# Patient Record
Sex: MALE | Race: WHITE | HISPANIC OR LATINO | Employment: UNEMPLOYED | URBAN - METROPOLITAN AREA
[De-identification: names, ages, dates, MRNs, and addresses within clinical notes are randomized per-mention and may not be internally consistent; named-entity substitution may affect disease eponyms.]

---

## 2020-08-14 ENCOUNTER — HOSPITAL ENCOUNTER (EMERGENCY)
Facility: HOSPITAL | Age: 50
Discharge: HOME | End: 2020-08-14
Attending: EMERGENCY MEDICINE
Payer: MEDICAID

## 2020-08-14 VITALS
WEIGHT: 240 LBS | SYSTOLIC BLOOD PRESSURE: 123 MMHG | HEART RATE: 109 BPM | OXYGEN SATURATION: 95 % | HEIGHT: 71 IN | BODY MASS INDEX: 33.6 KG/M2 | DIASTOLIC BLOOD PRESSURE: 77 MMHG | RESPIRATION RATE: 18 BRPM | TEMPERATURE: 99 F

## 2020-08-14 DIAGNOSIS — N49.2: Primary | ICD-10-CM

## 2020-08-14 DIAGNOSIS — S30.813A: Primary | ICD-10-CM

## 2020-08-14 PROCEDURE — 99284 EMERGENCY DEPT VISIT MOD MDM: CPT

## 2020-08-14 RX ORDER — CEPHALEXIN 500 MG/1
500 CAPSULE ORAL EVERY 8 HOURS
Qty: 21 CAPSULE | Refills: 0 | Status: SHIPPED | OUTPATIENT
Start: 2020-08-14 | End: 2020-08-21

## 2020-08-14 RX ORDER — BACITRACIN ZINC 500 UNIT/G
OINTMENT (GRAM) TOPICAL 2 TIMES DAILY
Qty: 28 G | Refills: 0 | Status: ON HOLD | OUTPATIENT
Start: 2020-08-14 | End: 2021-05-28
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
## 2020-08-14 NOTE — DISCHARGE INSTRUCTIONS
I recommend washing the affected area twice daily with soap and warm water and applying the prescribed ointment afterward
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
## 2021-01-14 ENCOUNTER — HOSPITAL ENCOUNTER (EMERGENCY)
Facility: HOSPITAL | Age: 51
Discharge: HOME | End: 2021-01-14
Attending: EMERGENCY MEDICINE
Payer: MEDICAID

## 2021-01-14 VITALS
HEART RATE: 98 BPM | SYSTOLIC BLOOD PRESSURE: 122 MMHG | RESPIRATION RATE: 18 BRPM | DIASTOLIC BLOOD PRESSURE: 84 MMHG | WEIGHT: 244 LBS | OXYGEN SATURATION: 100 % | BODY MASS INDEX: 34.16 KG/M2 | HEIGHT: 71 IN | TEMPERATURE: 98 F

## 2021-01-14 DIAGNOSIS — H57.11 ACUTE RIGHT EYE PAIN: Primary | ICD-10-CM

## 2021-01-14 DIAGNOSIS — S05.01XA ABRASION OF RIGHT CORNEA, INITIAL ENCOUNTER: ICD-10-CM

## 2021-01-14 PROCEDURE — 25000003 PHARM REV CODE 250: Performed by: PHYSICIAN ASSISTANT

## 2021-01-14 PROCEDURE — 99284 EMERGENCY DEPT VISIT MOD MDM: CPT

## 2021-01-14 RX ORDER — IBUPROFEN 600 MG/1
600 TABLET ORAL EVERY 6 HOURS PRN
Qty: 20 TABLET | Refills: 0 | Status: ON HOLD | OUTPATIENT
Start: 2021-01-14 | End: 2021-05-28 | Stop reason: CLARIF

## 2021-01-14 RX ORDER — ERYTHROMYCIN 5 MG/G
OINTMENT OPHTHALMIC
Qty: 1 TUBE | Refills: 0 | Status: ON HOLD | OUTPATIENT
Start: 2021-01-14 | End: 2021-05-28 | Stop reason: CLARIF

## 2021-01-14 RX ORDER — PROPARACAINE HYDROCHLORIDE 5 MG/ML
2 SOLUTION/ DROPS OPHTHALMIC
Status: COMPLETED | OUTPATIENT
Start: 2021-01-14 | End: 2021-01-14

## 2021-01-14 RX ADMIN — FLUORESCEIN SODIUM 1 EACH: 1 STRIP OPHTHALMIC at 06:01

## 2021-01-14 RX ADMIN — PROPARACAINE HYDROCHLORIDE 2 DROP: 5 SOLUTION/ DROPS OPHTHALMIC at 06:01

## 2021-04-11 ENCOUNTER — IMMUNIZATION (OUTPATIENT)
Dept: PRIMARY CARE CLINIC | Facility: CLINIC | Age: 51
End: 2021-04-11
Payer: MEDICAID

## 2021-04-11 DIAGNOSIS — Z23 NEED FOR VACCINATION: Primary | ICD-10-CM

## 2021-04-11 PROCEDURE — 0001A PR IMMUNIZ ADMIN, SARS-COV-2 COVID-19 VACC, 30MCG/0.3ML, 1ST DOSE: ICD-10-PCS | Mod: CV19,S$GLB,, | Performed by: INTERNAL MEDICINE

## 2021-04-11 PROCEDURE — 0001A PR IMMUNIZ ADMIN, SARS-COV-2 COVID-19 VACC, 30MCG/0.3ML, 1ST DOSE: CPT | Mod: CV19,S$GLB,, | Performed by: INTERNAL MEDICINE

## 2021-04-11 PROCEDURE — 91300 PR SARS-COV- 2 COVID-19 VACCINE, NO PRSV, 30MCG/0.3ML, IM: ICD-10-PCS | Mod: S$GLB,,, | Performed by: INTERNAL MEDICINE

## 2021-04-11 PROCEDURE — 91300 PR SARS-COV- 2 COVID-19 VACCINE, NO PRSV, 30MCG/0.3ML, IM: CPT | Mod: S$GLB,,, | Performed by: INTERNAL MEDICINE

## 2021-04-11 RX ADMIN — Medication 0.3 ML: at 03:04

## 2021-05-28 ENCOUNTER — ANESTHESIA (OUTPATIENT)
Dept: SURGERY | Facility: HOSPITAL | Age: 51
End: 2021-05-28
Payer: MEDICAID

## 2021-05-28 ENCOUNTER — HOSPITAL ENCOUNTER (OUTPATIENT)
Facility: HOSPITAL | Age: 51
Discharge: HOME | End: 2021-05-29
Attending: EMERGENCY MEDICINE | Admitting: ORTHOPAEDIC SURGERY
Payer: MEDICAID

## 2021-05-28 ENCOUNTER — ANESTHESIA EVENT (OUTPATIENT)
Dept: SURGERY | Facility: HOSPITAL | Age: 51
End: 2021-05-28
Payer: MEDICAID

## 2021-05-28 DIAGNOSIS — S82.852A CLOSED TRIMALLEOLAR FRACTURE OF LEFT ANKLE, INITIAL ENCOUNTER: ICD-10-CM

## 2021-05-28 DIAGNOSIS — S93.05XA CLOSED DISLOCATION OF LEFT TALUS, INITIAL ENCOUNTER: Primary | ICD-10-CM

## 2021-05-28 DIAGNOSIS — S82.852A CLOSED DISPLACED TRIMALLEOLAR FRACTURE OF LEFT ANKLE, INITIAL ENCOUNTER: ICD-10-CM

## 2021-05-28 DIAGNOSIS — Z01.810 PREOP CARDIOVASCULAR EXAM: ICD-10-CM

## 2021-05-28 LAB
ALBUMIN SERPL BCP-MCNC: 3.9 G/DL (ref 3.5–5.2)
ALP SERPL-CCNC: 43 U/L (ref 55–135)
ALT SERPL W/O P-5'-P-CCNC: 75 U/L (ref 10–44)
ANION GAP SERPL CALC-SCNC: 14 MMOL/L (ref 8–16)
AST SERPL-CCNC: 66 U/L (ref 10–40)
BASOPHILS # BLD AUTO: 0.04 K/UL (ref 0–0.2)
BASOPHILS NFR BLD: 0.4 % (ref 0–1.9)
BILIRUB SERPL-MCNC: 0.5 MG/DL (ref 0.1–1)
BUN SERPL-MCNC: 6 MG/DL (ref 6–20)
CALCIUM SERPL-MCNC: 9.2 MG/DL (ref 8.7–10.5)
CHLORIDE SERPL-SCNC: 102 MMOL/L (ref 95–110)
CO2 SERPL-SCNC: 19 MMOL/L (ref 23–29)
CREAT SERPL-MCNC: 0.8 MG/DL (ref 0.5–1.4)
CTP QC/QA: YES
DIFFERENTIAL METHOD: ABNORMAL
EOSINOPHIL # BLD AUTO: 0 K/UL (ref 0–0.5)
EOSINOPHIL NFR BLD: 0.3 % (ref 0–8)
ERYTHROCYTE [DISTWIDTH] IN BLOOD BY AUTOMATED COUNT: 12.6 % (ref 11.5–14.5)
EST. GFR  (AFRICAN AMERICAN): >60 ML/MIN/1.73 M^2
EST. GFR  (NON AFRICAN AMERICAN): >60 ML/MIN/1.73 M^2
ESTIMATED AVG GLUCOSE: 105 MG/DL (ref 68–131)
GLUCOSE SERPL-MCNC: 109 MG/DL (ref 70–110)
HBA1C MFR BLD: 5.3 % (ref 4–5.6)
HCT VFR BLD AUTO: 45 % (ref 40–54)
HGB BLD-MCNC: 15.5 G/DL (ref 14–18)
IMM GRANULOCYTES # BLD AUTO: 0.04 K/UL (ref 0–0.04)
IMM GRANULOCYTES NFR BLD AUTO: 0.4 % (ref 0–0.5)
INR PPP: 1 (ref 0.8–1.2)
LYMPHOCYTES # BLD AUTO: 2.1 K/UL (ref 1–4.8)
LYMPHOCYTES NFR BLD: 22.6 % (ref 18–48)
MAGNESIUM SERPL-MCNC: 2.3 MG/DL (ref 1.6–2.6)
MCH RBC QN AUTO: 31.1 PG (ref 27–31)
MCHC RBC AUTO-ENTMCNC: 34.4 G/DL (ref 32–36)
MCV RBC AUTO: 90 FL (ref 82–98)
MONOCYTES # BLD AUTO: 0.6 K/UL (ref 0.3–1)
MONOCYTES NFR BLD: 6.3 % (ref 4–15)
NEUTROPHILS # BLD AUTO: 6.6 K/UL (ref 1.8–7.7)
NEUTROPHILS NFR BLD: 70 % (ref 38–73)
NRBC BLD-RTO: 0 /100 WBC
PHOSPHATE SERPL-MCNC: 4.1 MG/DL (ref 2.7–4.5)
PLATELET # BLD AUTO: 191 K/UL (ref 150–450)
PMV BLD AUTO: 9.7 FL (ref 9.2–12.9)
POTASSIUM SERPL-SCNC: 3.7 MMOL/L (ref 3.5–5.1)
PREALB SERPL-MCNC: 35 MG/DL (ref 20–43)
PROT SERPL-MCNC: 7.4 G/DL (ref 6–8.4)
PROTHROMBIN TIME: 10.9 SEC (ref 9–12.5)
RBC # BLD AUTO: 4.98 M/UL (ref 4.6–6.2)
SARS-COV-2 RDRP RESP QL NAA+PROBE: NEGATIVE
SODIUM SERPL-SCNC: 135 MMOL/L (ref 136–145)
TRANSFERRIN SERPL-MCNC: 313 MG/DL (ref 200–375)
WBC # BLD AUTO: 9.39 K/UL (ref 3.9–12.7)

## 2021-05-28 PROCEDURE — 27829 TREAT LOWER LEG JOINT: CPT | Mod: 51,LT,, | Performed by: ORTHOPAEDIC SURGERY

## 2021-05-28 PROCEDURE — D9220A PRA ANESTHESIA: ICD-10-PCS | Mod: ANES,,, | Performed by: STUDENT IN AN ORGANIZED HEALTH CARE EDUCATION/TRAINING PROGRAM

## 2021-05-28 PROCEDURE — 01480 ANES OPEN PX LOWER L/A/F NOS: CPT | Performed by: ORTHOPAEDIC SURGERY

## 2021-05-28 PROCEDURE — 27818 TREATMENT OF ANKLE FRACTURE: CPT | Mod: LT,59

## 2021-05-28 PROCEDURE — 80053 COMPREHEN METABOLIC PANEL: CPT | Performed by: EMERGENCY MEDICINE

## 2021-05-28 PROCEDURE — 93010 EKG 12-LEAD: ICD-10-PCS | Mod: ,,, | Performed by: INTERNAL MEDICINE

## 2021-05-28 PROCEDURE — 76942 ECHO GUIDE FOR BIOPSY: CPT | Performed by: STUDENT IN AN ORGANIZED HEALTH CARE EDUCATION/TRAINING PROGRAM

## 2021-05-28 PROCEDURE — 84466 ASSAY OF TRANSFERRIN: CPT | Performed by: STUDENT IN AN ORGANIZED HEALTH CARE EDUCATION/TRAINING PROGRAM

## 2021-05-28 PROCEDURE — 63600175 PHARM REV CODE 636 W HCPCS: Performed by: STUDENT IN AN ORGANIZED HEALTH CARE EDUCATION/TRAINING PROGRAM

## 2021-05-28 PROCEDURE — 94761 N-INVAS EAR/PLS OXIMETRY MLT: CPT

## 2021-05-28 PROCEDURE — 76942 ECHO GUIDE FOR BIOPSY: CPT | Mod: 26,,, | Performed by: STUDENT IN AN ORGANIZED HEALTH CARE EDUCATION/TRAINING PROGRAM

## 2021-05-28 PROCEDURE — 99285 EMERGENCY DEPT VISIT HI MDM: CPT | Mod: 25,,, | Performed by: EMERGENCY MEDICINE

## 2021-05-28 PROCEDURE — 37000008 HC ANESTHESIA 1ST 15 MINUTES: Performed by: ORTHOPAEDIC SURGERY

## 2021-05-28 PROCEDURE — 93005 ELECTROCARDIOGRAM TRACING: CPT | Mod: 59

## 2021-05-28 PROCEDURE — C1769 GUIDE WIRE: HCPCS | Performed by: ORTHOPAEDIC SURGERY

## 2021-05-28 PROCEDURE — D9220A PRA ANESTHESIA: Mod: ANES,,, | Performed by: STUDENT IN AN ORGANIZED HEALTH CARE EDUCATION/TRAINING PROGRAM

## 2021-05-28 PROCEDURE — D9220A PRA ANESTHESIA: ICD-10-PCS | Mod: CRNA,,, | Performed by: NURSE ANESTHETIST, CERTIFIED REGISTERED

## 2021-05-28 PROCEDURE — 71000039 HC RECOVERY, EACH ADD'L HOUR: Performed by: ORTHOPAEDIC SURGERY

## 2021-05-28 PROCEDURE — 37000009 HC ANESTHESIA EA ADD 15 MINS: Performed by: ORTHOPAEDIC SURGERY

## 2021-05-28 PROCEDURE — 85025 COMPLETE CBC W/AUTO DIFF WBC: CPT | Performed by: EMERGENCY MEDICINE

## 2021-05-28 PROCEDURE — 63600175 PHARM REV CODE 636 W HCPCS: Performed by: NURSE ANESTHETIST, CERTIFIED REGISTERED

## 2021-05-28 PROCEDURE — 25000003 PHARM REV CODE 250: Performed by: STUDENT IN AN ORGANIZED HEALTH CARE EDUCATION/TRAINING PROGRAM

## 2021-05-28 PROCEDURE — G0378 HOSPITAL OBSERVATION PER HR: HCPCS

## 2021-05-28 PROCEDURE — 27829 PR OPEN TX DISTAL TIBIOFIBULAR JOINT DISRUPTION: ICD-10-PCS | Mod: 51,LT,, | Performed by: ORTHOPAEDIC SURGERY

## 2021-05-28 PROCEDURE — 83735 ASSAY OF MAGNESIUM: CPT | Performed by: STUDENT IN AN ORGANIZED HEALTH CARE EDUCATION/TRAINING PROGRAM

## 2021-05-28 PROCEDURE — 27823 TREATMENT OF ANKLE FRACTURE: CPT | Mod: LT,,, | Performed by: ORTHOPAEDIC SURGERY

## 2021-05-28 PROCEDURE — 96375 TX/PRO/DX INJ NEW DRUG ADDON: CPT | Mod: 59 | Performed by: EMERGENCY MEDICINE

## 2021-05-28 PROCEDURE — 64450 POPLITEAL SINGLE SHOT: ICD-10-PCS | Mod: 59,LT,, | Performed by: STUDENT IN AN ORGANIZED HEALTH CARE EDUCATION/TRAINING PROGRAM

## 2021-05-28 PROCEDURE — D9220A PRA ANESTHESIA: Mod: CRNA,,, | Performed by: NURSE ANESTHETIST, CERTIFIED REGISTERED

## 2021-05-28 PROCEDURE — 27823 PR OPEN TX TRIMALLEOLAR ANKLE FX W FIX PST LIP: ICD-10-PCS | Mod: LT,,, | Performed by: ORTHOPAEDIC SURGERY

## 2021-05-28 PROCEDURE — 93010 ELECTROCARDIOGRAM REPORT: CPT | Mod: ,,, | Performed by: INTERNAL MEDICINE

## 2021-05-28 PROCEDURE — 64447 NJX AA&/STRD FEMORAL NRV IMG: CPT | Performed by: STUDENT IN AN ORGANIZED HEALTH CARE EDUCATION/TRAINING PROGRAM

## 2021-05-28 PROCEDURE — 84100 ASSAY OF PHOSPHORUS: CPT | Performed by: STUDENT IN AN ORGANIZED HEALTH CARE EDUCATION/TRAINING PROGRAM

## 2021-05-28 PROCEDURE — 64445 NJX AA&/STRD SCIATIC NRV IMG: CPT | Mod: 59 | Performed by: STUDENT IN AN ORGANIZED HEALTH CARE EDUCATION/TRAINING PROGRAM

## 2021-05-28 PROCEDURE — 76942 ECHO GUIDE FOR BIOPSY: CPT | Mod: 59 | Performed by: STUDENT IN AN ORGANIZED HEALTH CARE EDUCATION/TRAINING PROGRAM

## 2021-05-28 PROCEDURE — 64450 NJX AA&/STRD OTHER PN/BRANCH: CPT | Mod: 59,LT,, | Performed by: STUDENT IN AN ORGANIZED HEALTH CARE EDUCATION/TRAINING PROGRAM

## 2021-05-28 PROCEDURE — 99285 PR EMERGENCY DEPT VISIT,LEVEL V: ICD-10-PCS | Mod: 25,,, | Performed by: EMERGENCY MEDICINE

## 2021-05-28 PROCEDURE — 36000708 HC OR TIME LEV III 1ST 15 MIN: Performed by: ORTHOPAEDIC SURGERY

## 2021-05-28 PROCEDURE — 99223 1ST HOSP IP/OBS HIGH 75: CPT | Mod: 57,,, | Performed by: ORTHOPAEDIC SURGERY

## 2021-05-28 PROCEDURE — 27840 TREAT ANKLE DISLOCATION: CPT | Mod: LT

## 2021-05-28 PROCEDURE — 99285 EMERGENCY DEPT VISIT HI MDM: CPT | Mod: 25

## 2021-05-28 PROCEDURE — 36000709 HC OR TIME LEV III EA ADD 15 MIN: Performed by: ORTHOPAEDIC SURGERY

## 2021-05-28 PROCEDURE — C1713 ANCHOR/SCREW BN/BN,TIS/BN: HCPCS | Performed by: ORTHOPAEDIC SURGERY

## 2021-05-28 PROCEDURE — 71000015 HC POSTOP RECOV 1ST HR: Performed by: ORTHOPAEDIC SURGERY

## 2021-05-28 PROCEDURE — 85610 PROTHROMBIN TIME: CPT | Performed by: STUDENT IN AN ORGANIZED HEALTH CARE EDUCATION/TRAINING PROGRAM

## 2021-05-28 PROCEDURE — 25000003 PHARM REV CODE 250: Performed by: EMERGENCY MEDICINE

## 2021-05-28 PROCEDURE — U0002 COVID-19 LAB TEST NON-CDC: HCPCS | Performed by: STUDENT IN AN ORGANIZED HEALTH CARE EDUCATION/TRAINING PROGRAM

## 2021-05-28 PROCEDURE — 63600175 PHARM REV CODE 636 W HCPCS: Performed by: ORTHOPAEDIC SURGERY

## 2021-05-28 PROCEDURE — 27201423 OPTIME MED/SURG SUP & DEVICES STERILE SUPPLY: Performed by: ORTHOPAEDIC SURGERY

## 2021-05-28 PROCEDURE — 71000033 HC RECOVERY, INTIAL HOUR: Performed by: ORTHOPAEDIC SURGERY

## 2021-05-28 PROCEDURE — 63600175 PHARM REV CODE 636 W HCPCS: Performed by: EMERGENCY MEDICINE

## 2021-05-28 PROCEDURE — 28540: ICD-10-PCS | Mod: 54,LT,, | Performed by: EMERGENCY MEDICINE

## 2021-05-28 PROCEDURE — 28540 TREAT FOOT DISLOCATION: CPT | Mod: 54,LT,, | Performed by: EMERGENCY MEDICINE

## 2021-05-28 PROCEDURE — 96374 THER/PROPH/DIAG INJ IV PUSH: CPT

## 2021-05-28 PROCEDURE — 76942 SAPHENOUS NERVE SINGLE INJECTION: ICD-10-PCS | Mod: 26,,, | Performed by: STUDENT IN AN ORGANIZED HEALTH CARE EDUCATION/TRAINING PROGRAM

## 2021-05-28 PROCEDURE — 64447 NJX AA&/STRD FEMORAL NRV IMG: CPT | Mod: 59 | Performed by: STUDENT IN AN ORGANIZED HEALTH CARE EDUCATION/TRAINING PROGRAM

## 2021-05-28 PROCEDURE — 83036 HEMOGLOBIN GLYCOSYLATED A1C: CPT | Performed by: STUDENT IN AN ORGANIZED HEALTH CARE EDUCATION/TRAINING PROGRAM

## 2021-05-28 PROCEDURE — 25000003 PHARM REV CODE 250: Performed by: NURSE ANESTHETIST, CERTIFIED REGISTERED

## 2021-05-28 PROCEDURE — 84134 ASSAY OF PREALBUMIN: CPT | Performed by: STUDENT IN AN ORGANIZED HEALTH CARE EDUCATION/TRAINING PROGRAM

## 2021-05-28 PROCEDURE — 99223 PR INITIAL HOSPITAL CARE,LEVL III: ICD-10-PCS | Mod: 57,,, | Performed by: ORTHOPAEDIC SURGERY

## 2021-05-28 DEVICE — IMPLANTABLE DEVICE: Type: IMPLANTABLE DEVICE | Site: ANKLE | Status: FUNCTIONAL

## 2021-05-28 DEVICE — SCREW BONE NON LOCK 3.5X22MM: Type: IMPLANTABLE DEVICE | Site: ANKLE | Status: FUNCTIONAL

## 2021-05-28 DEVICE — SCREW BONE NON LOCK 3.5X14MM: Type: IMPLANTABLE DEVICE | Site: ANKLE | Status: FUNCTIONAL

## 2021-05-28 DEVICE — PLATE BONE FIB DIS LAT VARIAX: Type: IMPLANTABLE DEVICE | Site: ANKLE | Status: FUNCTIONAL

## 2021-05-28 DEVICE — SCREW BONE LOCK T10 3.5X16MM: Type: IMPLANTABLE DEVICE | Site: ANKLE | Status: FUNCTIONAL

## 2021-05-28 DEVICE — SCREW BONE LOCK T10 3.5X14MM: Type: IMPLANTABLE DEVICE | Site: ANKLE | Status: FUNCTIONAL

## 2021-05-28 DEVICE — SCREW TITANIUM NONLOK 2.7X20MM: Type: IMPLANTABLE DEVICE | Site: ANKLE | Status: FUNCTIONAL

## 2021-05-28 DEVICE — SCREW BONE NON LOCK 3.5X16MM: Type: IMPLANTABLE DEVICE | Site: ANKLE | Status: FUNCTIONAL

## 2021-05-28 RX ORDER — LIDOCAINE HYDROCHLORIDE 10 MG/ML
20 INJECTION INFILTRATION; PERINEURAL ONCE
Status: COMPLETED | OUTPATIENT
Start: 2021-05-28 | End: 2021-05-28

## 2021-05-28 RX ORDER — VANCOMYCIN HYDROCHLORIDE 1 G/20ML
INJECTION, POWDER, LYOPHILIZED, FOR SOLUTION INTRAVENOUS
Status: DISCONTINUED | OUTPATIENT
Start: 2021-05-28 | End: 2021-05-28 | Stop reason: HOSPADM

## 2021-05-28 RX ORDER — CEFAZOLIN SODIUM 1 G/3ML
2 INJECTION, POWDER, FOR SOLUTION INTRAMUSCULAR; INTRAVENOUS
Status: COMPLETED | OUTPATIENT
Start: 2021-05-28 | End: 2021-05-29

## 2021-05-28 RX ORDER — OXYCODONE HYDROCHLORIDE 10 MG/1
10 TABLET ORAL EVERY 4 HOURS PRN
Status: DISCONTINUED | OUTPATIENT
Start: 2021-05-28 | End: 2021-05-29 | Stop reason: HOSPADM

## 2021-05-28 RX ORDER — SODIUM CHLORIDE 9 MG/ML
INJECTION, SOLUTION INTRAVENOUS CONTINUOUS PRN
Status: DISCONTINUED | OUTPATIENT
Start: 2021-05-28 | End: 2021-05-28

## 2021-05-28 RX ORDER — PROPOFOL 10 MG/ML
VIAL (ML) INTRAVENOUS CONTINUOUS PRN
Status: DISCONTINUED | OUTPATIENT
Start: 2021-05-28 | End: 2021-05-28

## 2021-05-28 RX ORDER — FENTANYL CITRATE 50 UG/ML
INJECTION, SOLUTION INTRAMUSCULAR; INTRAVENOUS
Status: DISCONTINUED | OUTPATIENT
Start: 2021-05-28 | End: 2021-05-28

## 2021-05-28 RX ORDER — NAPROXEN SODIUM 220 MG/1
81 TABLET, FILM COATED ORAL 2 TIMES DAILY
Status: DISCONTINUED | OUTPATIENT
Start: 2021-05-28 | End: 2021-05-29 | Stop reason: HOSPADM

## 2021-05-28 RX ORDER — LIDOCAINE HYDROCHLORIDE 10 MG/ML
1 INJECTION, SOLUTION EPIDURAL; INFILTRATION; INTRACAUDAL; PERINEURAL ONCE
Status: DISCONTINUED | OUTPATIENT
Start: 2021-05-28 | End: 2021-05-29 | Stop reason: HOSPADM

## 2021-05-28 RX ORDER — OXYCODONE HYDROCHLORIDE 5 MG/1
5 TABLET ORAL EVERY 4 HOURS PRN
Status: DISCONTINUED | OUTPATIENT
Start: 2021-05-28 | End: 2021-05-29 | Stop reason: HOSPADM

## 2021-05-28 RX ORDER — SODIUM CHLORIDE 0.9 % (FLUSH) 0.9 %
10 SYRINGE (ML) INJECTION
Status: DISCONTINUED | OUTPATIENT
Start: 2021-05-28 | End: 2021-05-29 | Stop reason: HOSPADM

## 2021-05-28 RX ORDER — HALOPERIDOL 5 MG/ML
0.5 INJECTION INTRAMUSCULAR EVERY 10 MIN PRN
Status: DISCONTINUED | OUTPATIENT
Start: 2021-05-28 | End: 2021-05-28 | Stop reason: HOSPADM

## 2021-05-28 RX ORDER — MORPHINE SULFATE 4 MG/ML
4 INJECTION, SOLUTION INTRAMUSCULAR; INTRAVENOUS
Status: COMPLETED | OUTPATIENT
Start: 2021-05-28 | End: 2021-05-28

## 2021-05-28 RX ORDER — FENTANYL CITRATE 50 UG/ML
25 INJECTION, SOLUTION INTRAMUSCULAR; INTRAVENOUS EVERY 5 MIN PRN
Status: DISCONTINUED | OUTPATIENT
Start: 2021-05-28 | End: 2021-05-28 | Stop reason: HOSPADM

## 2021-05-28 RX ORDER — ACETAMINOPHEN 325 MG/1
650 TABLET ORAL EVERY 8 HOURS PRN
Status: DISCONTINUED | OUTPATIENT
Start: 2021-05-28 | End: 2021-05-29 | Stop reason: HOSPADM

## 2021-05-28 RX ORDER — LIDOCAINE HYDROCHLORIDE 10 MG/ML
20 INJECTION INFILTRATION; PERINEURAL ONCE
Status: DISCONTINUED | OUTPATIENT
Start: 2021-05-28 | End: 2021-05-28

## 2021-05-28 RX ORDER — PROPOFOL 10 MG/ML
VIAL (ML) INTRAVENOUS
Status: DISCONTINUED | OUTPATIENT
Start: 2021-05-28 | End: 2021-05-28

## 2021-05-28 RX ORDER — HYDROMORPHONE HYDROCHLORIDE 1 MG/ML
0.2 INJECTION, SOLUTION INTRAMUSCULAR; INTRAVENOUS; SUBCUTANEOUS EVERY 5 MIN PRN
Status: DISCONTINUED | OUTPATIENT
Start: 2021-05-28 | End: 2021-05-28 | Stop reason: HOSPADM

## 2021-05-28 RX ORDER — ASPIRIN 81 MG/1
81 TABLET ORAL 2 TIMES DAILY
Qty: 90 TABLET | Refills: 0 | Status: SHIPPED | OUTPATIENT
Start: 2021-05-28 | End: 2021-08-23

## 2021-05-28 RX ORDER — KETAMINE HCL IN 0.9 % NACL 50 MG/5 ML
25 SYRINGE (ML) INTRAVENOUS ONCE
Status: COMPLETED | OUTPATIENT
Start: 2021-05-28 | End: 2021-05-28

## 2021-05-28 RX ORDER — SODIUM CHLORIDE 0.9 % (FLUSH) 0.9 %
3 SYRINGE (ML) INJECTION EVERY 4 HOURS PRN
Status: DISCONTINUED | OUTPATIENT
Start: 2021-05-28 | End: 2021-05-28 | Stop reason: HOSPADM

## 2021-05-28 RX ORDER — KETAMINE HCL IN 0.9 % NACL 50 MG/5 ML
SYRINGE (ML) INTRAVENOUS
Status: DISCONTINUED | OUTPATIENT
Start: 2021-05-28 | End: 2021-05-28

## 2021-05-28 RX ORDER — AMLODIPINE BESYLATE 5 MG/1
5 TABLET ORAL DAILY
Status: CANCELLED | OUTPATIENT
Start: 2021-05-29

## 2021-05-28 RX ORDER — MUPIROCIN 20 MG/G
OINTMENT TOPICAL
Status: DISCONTINUED | OUTPATIENT
Start: 2021-05-28 | End: 2021-05-28 | Stop reason: HOSPADM

## 2021-05-28 RX ORDER — LIDOCAINE HCL/PF 100 MG/5ML
SYRINGE (ML) INTRAVENOUS
Status: DISCONTINUED | OUTPATIENT
Start: 2021-05-28 | End: 2021-05-28

## 2021-05-28 RX ORDER — ACETAMINOPHEN 500 MG
500 TABLET ORAL EVERY 6 HOURS PRN
COMMUNITY
End: 2021-07-09

## 2021-05-28 RX ORDER — MIDAZOLAM HYDROCHLORIDE 1 MG/ML
0.5 INJECTION INTRAMUSCULAR; INTRAVENOUS
Status: DISCONTINUED | OUTPATIENT
Start: 2021-05-28 | End: 2021-05-28 | Stop reason: HOSPADM

## 2021-05-28 RX ORDER — CEFAZOLIN SODIUM 1 G/3ML
2 INJECTION, POWDER, FOR SOLUTION INTRAMUSCULAR; INTRAVENOUS
Status: COMPLETED | OUTPATIENT
Start: 2021-05-28 | End: 2021-05-28

## 2021-05-28 RX ORDER — MIDAZOLAM HYDROCHLORIDE 1 MG/ML
INJECTION INTRAMUSCULAR; INTRAVENOUS
Status: DISCONTINUED | OUTPATIENT
Start: 2021-05-28 | End: 2021-05-28

## 2021-05-28 RX ORDER — KETAMINE HCL IN 0.9 % NACL 50 MG/5 ML
20 SYRINGE (ML) INTRAVENOUS ONCE
Status: DISCONTINUED | OUTPATIENT
Start: 2021-05-28 | End: 2021-05-28

## 2021-05-28 RX ORDER — BUPIVACAINE HYDROCHLORIDE 5 MG/ML
INJECTION, SOLUTION EPIDURAL; INTRACAUDAL
Status: COMPLETED | OUTPATIENT
Start: 2021-05-28 | End: 2021-05-28

## 2021-05-28 RX ORDER — LISINOPRIL 10 MG/1
20 TABLET ORAL DAILY
Status: CANCELLED | OUTPATIENT
Start: 2021-05-29

## 2021-05-28 RX ORDER — TALC
6 POWDER (GRAM) TOPICAL NIGHTLY PRN
Status: DISCONTINUED | OUTPATIENT
Start: 2021-05-28 | End: 2021-05-29 | Stop reason: HOSPADM

## 2021-05-28 RX ORDER — DEXAMETHASONE SODIUM PHOSPHATE 4 MG/ML
INJECTION, SOLUTION INTRA-ARTICULAR; INTRALESIONAL; INTRAMUSCULAR; INTRAVENOUS; SOFT TISSUE
Status: DISCONTINUED | OUTPATIENT
Start: 2021-05-28 | End: 2021-05-28

## 2021-05-28 RX ORDER — AMLODIPINE BESYLATE 5 MG/1
5 TABLET ORAL DAILY
COMMUNITY
End: 2022-05-02

## 2021-05-28 RX ORDER — OXYCODONE HYDROCHLORIDE 5 MG/1
5 TABLET ORAL EVERY 4 HOURS PRN
Qty: 40 TABLET | Refills: 0 | Status: SHIPPED | OUTPATIENT
Start: 2021-05-28 | End: 2021-06-03 | Stop reason: SDUPTHER

## 2021-05-28 RX ORDER — KETAMINE HCL IN 0.9 % NACL 50 MG/5 ML
25 SYRINGE (ML) INTRAVENOUS ONCE
Status: DISCONTINUED | OUTPATIENT
Start: 2021-05-28 | End: 2021-05-28

## 2021-05-28 RX ORDER — ONDANSETRON 8 MG/1
8 TABLET, ORALLY DISINTEGRATING ORAL EVERY 8 HOURS PRN
Status: DISCONTINUED | OUTPATIENT
Start: 2021-05-28 | End: 2021-05-29 | Stop reason: HOSPADM

## 2021-05-28 RX ORDER — ONDANSETRON 2 MG/ML
INJECTION INTRAMUSCULAR; INTRAVENOUS
Status: DISCONTINUED | OUTPATIENT
Start: 2021-05-28 | End: 2021-05-28

## 2021-05-28 RX ORDER — LISINOPRIL 20 MG/1
20 TABLET ORAL DAILY
COMMUNITY
End: 2022-09-12

## 2021-05-28 RX ADMIN — DEXAMETHASONE SODIUM PHOSPHATE 4 MG: 4 INJECTION, SOLUTION INTRAMUSCULAR; INTRAVENOUS at 02:05

## 2021-05-28 RX ADMIN — MORPHINE SULFATE 4 MG: 4 INJECTION INTRAVENOUS at 03:05

## 2021-05-28 RX ADMIN — ASPIRIN 81 MG: 81 TABLET, CHEWABLE ORAL at 08:05

## 2021-05-28 RX ADMIN — FENTANYL CITRATE 25 MCG: 50 INJECTION, SOLUTION INTRAMUSCULAR; INTRAVENOUS at 02:05

## 2021-05-28 RX ADMIN — LIDOCAINE HYDROCHLORIDE 100 MG: 20 INJECTION, SOLUTION INTRAVENOUS at 12:05

## 2021-05-28 RX ADMIN — BUPIVACAINE HYDROCHLORIDE 15 ML: 5 INJECTION, SOLUTION EPIDURAL; INTRACAUDAL at 10:05

## 2021-05-28 RX ADMIN — MIDAZOLAM 2 MG: 1 INJECTION INTRAMUSCULAR; INTRAVENOUS at 10:05

## 2021-05-28 RX ADMIN — LIDOCAINE HYDROCHLORIDE 20 ML: 10 INJECTION, SOLUTION INFILTRATION; PERINEURAL at 01:05

## 2021-05-28 RX ADMIN — PROPOFOL 30 MG: 10 INJECTION, EMULSION INTRAVENOUS at 12:05

## 2021-05-28 RX ADMIN — SODIUM CHLORIDE: 0.9 INJECTION, SOLUTION INTRAVENOUS at 11:05

## 2021-05-28 RX ADMIN — MUPIROCIN: 20 OINTMENT TOPICAL at 10:05

## 2021-05-28 RX ADMIN — FENTANYL CITRATE 25 MCG: 50 INJECTION, SOLUTION INTRAMUSCULAR; INTRAVENOUS at 12:05

## 2021-05-28 RX ADMIN — PROPOFOL 100 MCG/KG/MIN: 10 INJECTION, EMULSION INTRAVENOUS at 12:05

## 2021-05-28 RX ADMIN — Medication 20 MG: at 12:05

## 2021-05-28 RX ADMIN — OXYCODONE HYDROCHLORIDE 10 MG: 10 TABLET ORAL at 08:05

## 2021-05-28 RX ADMIN — ONDANSETRON 4 MG: 2 INJECTION INTRAMUSCULAR; INTRAVENOUS at 02:05

## 2021-05-28 RX ADMIN — PROPOFOL 50 MG: 10 INJECTION, EMULSION INTRAVENOUS at 12:05

## 2021-05-28 RX ADMIN — FENTANYL CITRATE 100 MCG: 50 INJECTION, SOLUTION INTRAMUSCULAR; INTRAVENOUS at 10:05

## 2021-05-28 RX ADMIN — MIDAZOLAM HYDROCHLORIDE 2 MG: 1 INJECTION, SOLUTION INTRAMUSCULAR; INTRAVENOUS at 12:05

## 2021-05-28 RX ADMIN — Medication 10 MG: at 12:05

## 2021-05-28 RX ADMIN — CEFAZOLIN 2 G: 330 INJECTION, POWDER, FOR SOLUTION INTRAMUSCULAR; INTRAVENOUS at 12:05

## 2021-05-28 RX ADMIN — PROPOFOL 70 MG: 10 INJECTION, EMULSION INTRAVENOUS at 12:05

## 2021-05-28 RX ADMIN — GLYCOPYRROLATE 0.2 MCG: 0.2 INJECTION, SOLUTION INTRAMUSCULAR; INTRAVITREAL at 12:05

## 2021-05-28 RX ADMIN — Medication 25 MG: at 12:05

## 2021-05-28 RX ADMIN — Medication 10 MG: at 02:05

## 2021-05-28 RX ADMIN — CEFAZOLIN 2 G: 330 INJECTION, POWDER, FOR SOLUTION INTRAMUSCULAR; INTRAVENOUS at 08:05

## 2021-05-28 RX ADMIN — BUPIVACAINE HYDROCHLORIDE 30 ML: 5 INJECTION, SOLUTION EPIDURAL; INTRACAUDAL; PERINEURAL at 10:05

## 2021-05-28 RX ADMIN — Medication 10 MG: at 01:05

## 2021-05-29 VITALS
OXYGEN SATURATION: 94 % | WEIGHT: 259 LBS | HEIGHT: 71 IN | RESPIRATION RATE: 15 BRPM | SYSTOLIC BLOOD PRESSURE: 141 MMHG | BODY MASS INDEX: 36.26 KG/M2 | HEART RATE: 78 BPM | TEMPERATURE: 98 F | DIASTOLIC BLOOD PRESSURE: 94 MMHG

## 2021-05-29 PROCEDURE — 97165 OT EVAL LOW COMPLEX 30 MIN: CPT

## 2021-05-29 PROCEDURE — 97116 GAIT TRAINING THERAPY: CPT

## 2021-05-29 PROCEDURE — 97161 PT EVAL LOW COMPLEX 20 MIN: CPT

## 2021-05-29 PROCEDURE — 97535 SELF CARE MNGMENT TRAINING: CPT

## 2021-05-29 PROCEDURE — 63600175 PHARM REV CODE 636 W HCPCS: Performed by: STUDENT IN AN ORGANIZED HEALTH CARE EDUCATION/TRAINING PROGRAM

## 2021-05-29 PROCEDURE — 25000003 PHARM REV CODE 250: Performed by: STUDENT IN AN ORGANIZED HEALTH CARE EDUCATION/TRAINING PROGRAM

## 2021-05-29 PROCEDURE — 97530 THERAPEUTIC ACTIVITIES: CPT

## 2021-05-29 PROCEDURE — G0378 HOSPITAL OBSERVATION PER HR: HCPCS

## 2021-05-29 RX ADMIN — ASPIRIN 81 MG: 81 TABLET, CHEWABLE ORAL at 09:05

## 2021-05-29 RX ADMIN — OXYCODONE HYDROCHLORIDE 10 MG: 10 TABLET ORAL at 12:05

## 2021-05-29 RX ADMIN — CEFAZOLIN 2 G: 330 INJECTION, POWDER, FOR SOLUTION INTRAMUSCULAR; INTRAVENOUS at 04:05

## 2021-05-29 RX ADMIN — OXYCODONE HYDROCHLORIDE 10 MG: 10 TABLET ORAL at 01:05

## 2021-05-29 RX ADMIN — OXYCODONE HYDROCHLORIDE 10 MG: 10 TABLET ORAL at 09:05

## 2021-05-29 RX ADMIN — CEFAZOLIN 2 G: 330 INJECTION, POWDER, FOR SOLUTION INTRAMUSCULAR; INTRAVENOUS at 12:05

## 2021-05-29 RX ADMIN — OXYCODONE HYDROCHLORIDE 10 MG: 10 TABLET ORAL at 06:05

## 2021-05-29 RX ADMIN — MELATONIN TAB 3 MG 6 MG: 3 TAB at 12:05

## 2021-06-03 DIAGNOSIS — Z09 S/P ORTHOPEDIC SURGERY, FOLLOW-UP EXAM: Primary | ICD-10-CM

## 2021-06-03 RX ORDER — OXYCODONE HYDROCHLORIDE 5 MG/1
5 TABLET ORAL EVERY 6 HOURS PRN
Qty: 28 TABLET | Refills: 0 | Status: SHIPPED | OUTPATIENT
Start: 2021-06-03 | End: 2021-06-11

## 2021-06-07 ENCOUNTER — TELEPHONE (OUTPATIENT)
Dept: SLEEP MEDICINE | Facility: CLINIC | Age: 51
End: 2021-06-07

## 2021-06-11 ENCOUNTER — OFFICE VISIT (OUTPATIENT)
Dept: ORTHOPEDICS | Facility: CLINIC | Age: 51
End: 2021-06-11
Payer: MEDICAID

## 2021-06-11 ENCOUNTER — HOSPITAL ENCOUNTER (OUTPATIENT)
Dept: RADIOLOGY | Facility: HOSPITAL | Age: 51
Discharge: HOME | End: 2021-06-11
Attending: PHYSICIAN ASSISTANT
Payer: MEDICAID

## 2021-06-11 VITALS
SYSTOLIC BLOOD PRESSURE: 124 MMHG | DIASTOLIC BLOOD PRESSURE: 92 MMHG | RESPIRATION RATE: 18 BRPM | HEART RATE: 105 BPM | TEMPERATURE: 97 F

## 2021-06-11 DIAGNOSIS — S82.852A CLOSED DISPLACED TRIMALLEOLAR FRACTURE OF LEFT ANKLE, INITIAL ENCOUNTER: ICD-10-CM

## 2021-06-11 DIAGNOSIS — S82.852A CLOSED DISPLACED TRIMALLEOLAR FRACTURE OF LEFT ANKLE, INITIAL ENCOUNTER: Primary | ICD-10-CM

## 2021-06-11 DIAGNOSIS — Z09 S/P ORTHOPEDIC SURGERY, FOLLOW-UP EXAM: ICD-10-CM

## 2021-06-11 PROCEDURE — 99024 POSTOP FOLLOW-UP VISIT: CPT | Mod: ,,, | Performed by: PHYSICIAN ASSISTANT

## 2021-06-11 PROCEDURE — 73610 X-RAY EXAM OF ANKLE: CPT | Mod: 26,LT,, | Performed by: RADIOLOGY

## 2021-06-11 PROCEDURE — 73610 X-RAY EXAM OF ANKLE: CPT | Mod: TC,LT

## 2021-06-11 PROCEDURE — 99213 OFFICE O/P EST LOW 20 MIN: CPT | Mod: PBBFAC,25 | Performed by: PHYSICIAN ASSISTANT

## 2021-06-11 PROCEDURE — 99999 PR PBB SHADOW E&M-EST. PATIENT-LVL III: CPT | Mod: PBBFAC,,, | Performed by: PHYSICIAN ASSISTANT

## 2021-06-11 PROCEDURE — 73610 XR ANKLE COMPLETE 3 VIEW LEFT: ICD-10-PCS | Mod: 26,LT,, | Performed by: RADIOLOGY

## 2021-06-11 PROCEDURE — 99024 PR POST-OP FOLLOW-UP VISIT: ICD-10-PCS | Mod: ,,, | Performed by: PHYSICIAN ASSISTANT

## 2021-06-11 PROCEDURE — 99999 PR PBB SHADOW E&M-EST. PATIENT-LVL III: ICD-10-PCS | Mod: PBBFAC,,, | Performed by: PHYSICIAN ASSISTANT

## 2021-06-11 RX ORDER — OXYCODONE HYDROCHLORIDE 5 MG/1
5 TABLET ORAL EVERY 6 HOURS PRN
Qty: 28 TABLET | Refills: 0 | Status: SHIPPED | OUTPATIENT
Start: 2021-06-11 | End: 2021-07-09

## 2021-06-14 ENCOUNTER — TELEPHONE (OUTPATIENT)
Dept: ORTHOPEDICS | Facility: CLINIC | Age: 51
End: 2021-06-14

## 2021-06-22 RX ORDER — OXYCODONE HYDROCHLORIDE 5 MG/1
5 TABLET ORAL EVERY 4 HOURS PRN
Qty: 40 TABLET | Refills: 0 | OUTPATIENT
Start: 2021-06-22

## 2021-06-28 ENCOUNTER — OFFICE VISIT (OUTPATIENT)
Dept: SLEEP MEDICINE | Facility: CLINIC | Age: 51
End: 2021-06-28
Payer: MEDICAID

## 2021-06-28 VITALS
SYSTOLIC BLOOD PRESSURE: 136 MMHG | DIASTOLIC BLOOD PRESSURE: 86 MMHG | HEART RATE: 84 BPM | HEIGHT: 71 IN | WEIGHT: 258.38 LBS | BODY MASS INDEX: 36.17 KG/M2

## 2021-06-28 DIAGNOSIS — G47.33 OSA (OBSTRUCTIVE SLEEP APNEA): Primary | ICD-10-CM

## 2021-06-28 PROCEDURE — 99213 OFFICE O/P EST LOW 20 MIN: CPT | Mod: PBBFAC | Performed by: INTERNAL MEDICINE

## 2021-06-28 PROCEDURE — 99202 OFFICE O/P NEW SF 15 MIN: CPT | Mod: S$PBB,,, | Performed by: INTERNAL MEDICINE

## 2021-06-28 PROCEDURE — 99999 PR PBB SHADOW E&M-EST. PATIENT-LVL III: ICD-10-PCS | Mod: PBBFAC,,, | Performed by: INTERNAL MEDICINE

## 2021-06-28 PROCEDURE — 99202 PR OFFICE/OUTPT VISIT, NEW, LEVL II, 15-29 MIN: ICD-10-PCS | Mod: S$PBB,,, | Performed by: INTERNAL MEDICINE

## 2021-06-28 PROCEDURE — 99999 PR PBB SHADOW E&M-EST. PATIENT-LVL III: CPT | Mod: PBBFAC,,, | Performed by: INTERNAL MEDICINE

## 2021-07-09 ENCOUNTER — HOSPITAL ENCOUNTER (OUTPATIENT)
Dept: RADIOLOGY | Facility: HOSPITAL | Age: 51
Discharge: HOME | End: 2021-07-09
Attending: PHYSICIAN ASSISTANT
Payer: MEDICAID

## 2021-07-09 ENCOUNTER — OFFICE VISIT (OUTPATIENT)
Dept: ORTHOPEDICS | Facility: CLINIC | Age: 51
End: 2021-07-09
Payer: MEDICAID

## 2021-07-09 VITALS
HEART RATE: 105 BPM | RESPIRATION RATE: 18 BRPM | SYSTOLIC BLOOD PRESSURE: 131 MMHG | DIASTOLIC BLOOD PRESSURE: 87 MMHG | TEMPERATURE: 98 F

## 2021-07-09 DIAGNOSIS — S82.852A CLOSED DISPLACED TRIMALLEOLAR FRACTURE OF LEFT ANKLE, INITIAL ENCOUNTER: ICD-10-CM

## 2021-07-09 DIAGNOSIS — S82.852A CLOSED DISPLACED TRIMALLEOLAR FRACTURE OF LEFT ANKLE, INITIAL ENCOUNTER: Primary | ICD-10-CM

## 2021-07-09 PROCEDURE — 99999 PR PBB SHADOW E&M-EST. PATIENT-LVL III: CPT | Mod: PBBFAC,,, | Performed by: PHYSICIAN ASSISTANT

## 2021-07-09 PROCEDURE — 99999 PR PBB SHADOW E&M-EST. PATIENT-LVL III: ICD-10-PCS | Mod: PBBFAC,,, | Performed by: PHYSICIAN ASSISTANT

## 2021-07-09 PROCEDURE — 73610 XR ANKLE COMPLETE 3 VIEW LEFT: ICD-10-PCS | Mod: 26,LT,, | Performed by: RADIOLOGY

## 2021-07-09 PROCEDURE — 99024 POSTOP FOLLOW-UP VISIT: CPT | Mod: ,,, | Performed by: PHYSICIAN ASSISTANT

## 2021-07-09 PROCEDURE — 99024 PR POST-OP FOLLOW-UP VISIT: ICD-10-PCS | Mod: ,,, | Performed by: PHYSICIAN ASSISTANT

## 2021-07-09 PROCEDURE — 73610 X-RAY EXAM OF ANKLE: CPT | Mod: 26,LT,, | Performed by: RADIOLOGY

## 2021-07-09 PROCEDURE — 99213 OFFICE O/P EST LOW 20 MIN: CPT | Mod: PBBFAC | Performed by: PHYSICIAN ASSISTANT

## 2021-07-09 PROCEDURE — 73610 X-RAY EXAM OF ANKLE: CPT | Mod: TC,LT

## 2021-08-04 ENCOUNTER — TELEPHONE (OUTPATIENT)
Dept: ORTHOPEDICS | Facility: CLINIC | Age: 51
End: 2021-08-04

## 2021-08-17 ENCOUNTER — OFFICE VISIT (OUTPATIENT)
Dept: OTOLARYNGOLOGY | Facility: CLINIC | Age: 51
End: 2021-08-17
Payer: MEDICAID

## 2021-08-17 VITALS — HEART RATE: 93 BPM | DIASTOLIC BLOOD PRESSURE: 81 MMHG | SYSTOLIC BLOOD PRESSURE: 115 MMHG

## 2021-08-17 DIAGNOSIS — G47.33 OSA (OBSTRUCTIVE SLEEP APNEA): ICD-10-CM

## 2021-08-17 PROCEDURE — 99213 OFFICE O/P EST LOW 20 MIN: CPT | Mod: PBBFAC | Performed by: OTOLARYNGOLOGY

## 2021-08-17 PROCEDURE — 99999 PR PBB SHADOW E&M-EST. PATIENT-LVL III: CPT | Mod: PBBFAC,,, | Performed by: OTOLARYNGOLOGY

## 2021-08-17 PROCEDURE — 99499 UNLISTED E&M SERVICE: CPT | Mod: S$PBB,,, | Performed by: OTOLARYNGOLOGY

## 2021-08-17 PROCEDURE — 99999 PR PBB SHADOW E&M-EST. PATIENT-LVL III: ICD-10-PCS | Mod: PBBFAC,,, | Performed by: OTOLARYNGOLOGY

## 2021-08-17 PROCEDURE — 99499 NO LOS: ICD-10-PCS | Mod: S$PBB,,, | Performed by: OTOLARYNGOLOGY

## 2021-08-18 PROBLEM — G47.33 OSA (OBSTRUCTIVE SLEEP APNEA): Status: ACTIVE | Noted: 2021-08-18

## 2021-08-20 ENCOUNTER — OFFICE VISIT (OUTPATIENT)
Dept: ORTHOPEDICS | Facility: CLINIC | Age: 51
End: 2021-08-20
Payer: MEDICAID

## 2021-08-20 ENCOUNTER — HOSPITAL ENCOUNTER (OUTPATIENT)
Dept: RADIOLOGY | Facility: HOSPITAL | Age: 51
Discharge: HOME | End: 2021-08-20
Attending: NURSE PRACTITIONER
Payer: MEDICAID

## 2021-08-20 ENCOUNTER — TELEPHONE (OUTPATIENT)
Dept: ENDOSCOPY | Facility: HOSPITAL | Age: 51
End: 2021-08-20

## 2021-08-20 ENCOUNTER — OFFICE VISIT (OUTPATIENT)
Dept: OTOLARYNGOLOGY | Facility: CLINIC | Age: 51
End: 2021-08-20
Payer: MEDICAID

## 2021-08-20 VITALS
BODY MASS INDEX: 36.17 KG/M2 | WEIGHT: 258.38 LBS | DIASTOLIC BLOOD PRESSURE: 98 MMHG | HEART RATE: 89 BPM | HEIGHT: 71 IN | SYSTOLIC BLOOD PRESSURE: 142 MMHG

## 2021-08-20 VITALS — DIASTOLIC BLOOD PRESSURE: 98 MMHG | SYSTOLIC BLOOD PRESSURE: 142 MMHG | HEART RATE: 95 BPM | TEMPERATURE: 98 F

## 2021-08-20 DIAGNOSIS — J34.2 NASAL SEPTAL DEVIATION: ICD-10-CM

## 2021-08-20 DIAGNOSIS — K14.8 ACQUIRED MACROGLOSSIA: ICD-10-CM

## 2021-08-20 DIAGNOSIS — Z98.890 POST-OPERATIVE STATE: ICD-10-CM

## 2021-08-20 DIAGNOSIS — K13.79 HYPERTROPHIC SOFT PALATE: ICD-10-CM

## 2021-08-20 DIAGNOSIS — M25.572 ACUTE LEFT ANKLE PAIN: Primary | ICD-10-CM

## 2021-08-20 DIAGNOSIS — Z78.9 INTOLERANCE OF CONTINUOUS POSITIVE AIRWAY PRESSURE (CPAP) VENTILATION: ICD-10-CM

## 2021-08-20 DIAGNOSIS — M25.572 ACUTE LEFT ANKLE PAIN: ICD-10-CM

## 2021-08-20 DIAGNOSIS — K13.79 HYPERTROPHY OF UVULA: ICD-10-CM

## 2021-08-20 DIAGNOSIS — J35.1 HYPERTROPHY OF TONSIL: ICD-10-CM

## 2021-08-20 DIAGNOSIS — G47.33 OSA (OBSTRUCTIVE SLEEP APNEA): Primary | ICD-10-CM

## 2021-08-20 DIAGNOSIS — S82.852D CLOSED DISPLACED TRIMALLEOLAR FRACTURE OF LEFT ANKLE WITH ROUTINE HEALING, SUBSEQUENT ENCOUNTER: Primary | ICD-10-CM

## 2021-08-20 PROCEDURE — 99214 PR OFFICE/OUTPT VISIT, EST, LEVL IV, 30-39 MIN: ICD-10-PCS | Mod: S$PBB,,, | Performed by: SPECIALIST

## 2021-08-20 PROCEDURE — 99213 OFFICE O/P EST LOW 20 MIN: CPT | Mod: PBBFAC | Performed by: NURSE PRACTITIONER

## 2021-08-20 PROCEDURE — 99213 OFFICE O/P EST LOW 20 MIN: CPT | Mod: PBBFAC,27,PN | Performed by: SPECIALIST

## 2021-08-20 PROCEDURE — 99999 PR PBB SHADOW E&M-EST. PATIENT-LVL III: CPT | Mod: PBBFAC,,, | Performed by: SPECIALIST

## 2021-08-20 PROCEDURE — 73610 X-RAY EXAM OF ANKLE: CPT | Mod: TC,LT

## 2021-08-20 PROCEDURE — 99999 PR PBB SHADOW E&M-EST. PATIENT-LVL III: ICD-10-PCS | Mod: PBBFAC,,, | Performed by: NURSE PRACTITIONER

## 2021-08-20 PROCEDURE — 99214 OFFICE O/P EST MOD 30 MIN: CPT | Mod: S$PBB,,, | Performed by: SPECIALIST

## 2021-08-20 PROCEDURE — 99024 PR POST-OP FOLLOW-UP VISIT: ICD-10-PCS | Mod: ,,, | Performed by: NURSE PRACTITIONER

## 2021-08-20 PROCEDURE — 73610 XR ANKLE COMPLETE 3 VIEW LEFT: ICD-10-PCS | Mod: 26,LT,, | Performed by: RADIOLOGY

## 2021-08-20 PROCEDURE — 99024 POSTOP FOLLOW-UP VISIT: CPT | Mod: ,,, | Performed by: NURSE PRACTITIONER

## 2021-08-20 PROCEDURE — 99999 PR PBB SHADOW E&M-EST. PATIENT-LVL III: CPT | Mod: PBBFAC,,, | Performed by: NURSE PRACTITIONER

## 2021-08-20 PROCEDURE — 99999 PR PBB SHADOW E&M-EST. PATIENT-LVL III: ICD-10-PCS | Mod: PBBFAC,,, | Performed by: SPECIALIST

## 2021-08-20 PROCEDURE — 73610 X-RAY EXAM OF ANKLE: CPT | Mod: 26,LT,, | Performed by: RADIOLOGY

## 2021-08-23 PROBLEM — J35.1 HYPERTROPHY OF TONSIL: Status: ACTIVE | Noted: 2021-08-23

## 2021-08-23 PROBLEM — J34.2 NASAL SEPTAL DEVIATION: Status: ACTIVE | Noted: 2021-08-23

## 2021-08-23 PROBLEM — K13.79 HYPERTROPHIC SOFT PALATE: Status: ACTIVE | Noted: 2021-08-23

## 2021-08-23 PROBLEM — Z78.9 INTOLERANCE OF CONTINUOUS POSITIVE AIRWAY PRESSURE (CPAP) VENTILATION: Status: ACTIVE | Noted: 2021-08-23

## 2021-08-23 PROBLEM — K14.8 ACQUIRED MACROGLOSSIA: Status: ACTIVE | Noted: 2021-08-23

## 2021-08-23 PROBLEM — K13.79 HYPERTROPHY OF UVULA: Status: ACTIVE | Noted: 2021-08-23

## 2021-08-25 ENCOUNTER — CLINICAL SUPPORT (OUTPATIENT)
Dept: REHABILITATION | Facility: HOSPITAL | Age: 51
End: 2021-08-25
Attending: NURSE PRACTITIONER
Payer: MEDICAID

## 2021-08-25 DIAGNOSIS — M25.672 DECREASED RANGE OF MOTION OF LEFT ANKLE: ICD-10-CM

## 2021-08-25 DIAGNOSIS — S82.852D CLOSED DISPLACED TRIMALLEOLAR FRACTURE OF LEFT ANKLE WITH ROUTINE HEALING, SUBSEQUENT ENCOUNTER: ICD-10-CM

## 2021-08-25 DIAGNOSIS — Z74.09 IMPAIRED FUNCTIONAL MOBILITY, BALANCE, GAIT, AND ENDURANCE: ICD-10-CM

## 2021-08-25 PROCEDURE — 97110 THERAPEUTIC EXERCISES: CPT

## 2021-08-25 PROCEDURE — 97162 PT EVAL MOD COMPLEX 30 MIN: CPT

## 2021-09-10 ENCOUNTER — CLINICAL SUPPORT (OUTPATIENT)
Dept: REHABILITATION | Facility: OTHER | Age: 51
End: 2021-09-10
Attending: PHYSICIAN ASSISTANT
Payer: MEDICAID

## 2021-09-10 DIAGNOSIS — S82.852D CLOSED DISPLACED TRIMALLEOLAR FRACTURE OF LEFT ANKLE WITH ROUTINE HEALING, SUBSEQUENT ENCOUNTER: Primary | ICD-10-CM

## 2021-09-10 PROCEDURE — 97110 THERAPEUTIC EXERCISES: CPT | Performed by: PHYSICAL THERAPIST

## 2021-09-10 PROCEDURE — 97140 MANUAL THERAPY 1/> REGIONS: CPT | Performed by: PHYSICAL THERAPIST

## 2021-09-13 ENCOUNTER — TELEPHONE (OUTPATIENT)
Dept: REHABILITATION | Facility: HOSPITAL | Age: 51
End: 2021-09-13

## 2021-09-13 DIAGNOSIS — Z74.09 IMPAIRED FUNCTIONAL MOBILITY, BALANCE, GAIT, AND ENDURANCE: ICD-10-CM

## 2021-09-13 DIAGNOSIS — M25.672 DECREASED RANGE OF MOTION OF LEFT ANKLE: Primary | ICD-10-CM

## 2021-09-14 ENCOUNTER — CLINICAL SUPPORT (OUTPATIENT)
Dept: REHABILITATION | Facility: HOSPITAL | Age: 51
End: 2021-09-14
Attending: NURSE PRACTITIONER
Payer: MEDICAID

## 2021-09-14 DIAGNOSIS — Z74.09 IMPAIRED FUNCTIONAL MOBILITY, BALANCE, GAIT, AND ENDURANCE: ICD-10-CM

## 2021-09-14 DIAGNOSIS — M25.672 DECREASED RANGE OF MOTION OF LEFT ANKLE: ICD-10-CM

## 2021-09-14 PROCEDURE — 97110 THERAPEUTIC EXERCISES: CPT

## 2021-09-16 ENCOUNTER — CLINICAL SUPPORT (OUTPATIENT)
Dept: REHABILITATION | Facility: HOSPITAL | Age: 51
End: 2021-09-16
Attending: NURSE PRACTITIONER
Payer: MEDICAID

## 2021-09-16 DIAGNOSIS — Z74.09 IMPAIRED FUNCTIONAL MOBILITY, BALANCE, GAIT, AND ENDURANCE: ICD-10-CM

## 2021-09-16 DIAGNOSIS — M25.672 DECREASED RANGE OF MOTION OF LEFT ANKLE: ICD-10-CM

## 2021-09-16 PROCEDURE — 97110 THERAPEUTIC EXERCISES: CPT

## 2021-09-22 ENCOUNTER — TELEPHONE (OUTPATIENT)
Dept: OTOLARYNGOLOGY | Facility: CLINIC | Age: 51
End: 2021-09-22

## 2021-09-23 ENCOUNTER — CLINICAL SUPPORT (OUTPATIENT)
Dept: REHABILITATION | Facility: HOSPITAL | Age: 51
End: 2021-09-23
Attending: NURSE PRACTITIONER
Payer: MEDICAID

## 2021-09-23 DIAGNOSIS — M25.672 DECREASED RANGE OF MOTION OF LEFT ANKLE: ICD-10-CM

## 2021-09-23 DIAGNOSIS — Z74.09 IMPAIRED FUNCTIONAL MOBILITY, BALANCE, GAIT, AND ENDURANCE: ICD-10-CM

## 2021-09-23 PROCEDURE — 97110 THERAPEUTIC EXERCISES: CPT

## 2021-09-27 ENCOUNTER — TELEPHONE (OUTPATIENT)
Dept: REHABILITATION | Facility: HOSPITAL | Age: 51
End: 2021-09-27

## 2021-09-27 DIAGNOSIS — Z74.09 IMPAIRED FUNCTIONAL MOBILITY, BALANCE, GAIT, AND ENDURANCE: ICD-10-CM

## 2021-09-27 DIAGNOSIS — M25.672 DECREASED RANGE OF MOTION OF LEFT ANKLE: Primary | ICD-10-CM

## 2021-09-30 ENCOUNTER — CLINICAL SUPPORT (OUTPATIENT)
Dept: REHABILITATION | Facility: HOSPITAL | Age: 51
End: 2021-09-30
Attending: NURSE PRACTITIONER
Payer: MEDICAID

## 2021-09-30 DIAGNOSIS — Z74.09 IMPAIRED FUNCTIONAL MOBILITY, BALANCE, GAIT, AND ENDURANCE: ICD-10-CM

## 2021-09-30 DIAGNOSIS — M25.672 DECREASED RANGE OF MOTION OF LEFT ANKLE: ICD-10-CM

## 2021-09-30 PROCEDURE — 97110 THERAPEUTIC EXERCISES: CPT

## 2021-10-07 ENCOUNTER — CLINICAL SUPPORT (OUTPATIENT)
Dept: REHABILITATION | Facility: HOSPITAL | Age: 51
End: 2021-10-07
Payer: MEDICAID

## 2021-10-07 DIAGNOSIS — Z74.09 IMPAIRED FUNCTIONAL MOBILITY, BALANCE, GAIT, AND ENDURANCE: ICD-10-CM

## 2021-10-07 DIAGNOSIS — M25.672 DECREASED RANGE OF MOTION OF LEFT ANKLE: ICD-10-CM

## 2021-10-07 PROCEDURE — 97110 THERAPEUTIC EXERCISES: CPT

## 2021-10-12 ENCOUNTER — CLINICAL SUPPORT (OUTPATIENT)
Dept: REHABILITATION | Facility: HOSPITAL | Age: 51
End: 2021-10-12
Payer: MEDICAID

## 2021-10-12 DIAGNOSIS — Z74.09 IMPAIRED FUNCTIONAL MOBILITY, BALANCE, GAIT, AND ENDURANCE: ICD-10-CM

## 2021-10-12 DIAGNOSIS — M25.672 DECREASED RANGE OF MOTION OF LEFT ANKLE: ICD-10-CM

## 2021-10-12 PROCEDURE — 97110 THERAPEUTIC EXERCISES: CPT

## 2021-11-04 ENCOUNTER — TELEPHONE (OUTPATIENT)
Dept: REHABILITATION | Facility: HOSPITAL | Age: 51
End: 2021-11-04
Payer: MEDICAID

## 2021-11-04 DIAGNOSIS — Z74.09 IMPAIRED FUNCTIONAL MOBILITY, BALANCE, GAIT, AND ENDURANCE: ICD-10-CM

## 2021-11-04 DIAGNOSIS — M25.672 DECREASED RANGE OF MOTION OF LEFT ANKLE: Primary | ICD-10-CM

## 2021-11-10 ENCOUNTER — TELEPHONE (OUTPATIENT)
Dept: ORTHOPEDICS | Facility: CLINIC | Age: 51
End: 2021-11-10
Payer: MEDICAID

## 2021-11-22 ENCOUNTER — OFFICE VISIT (OUTPATIENT)
Dept: ORTHOPEDICS | Facility: CLINIC | Age: 51
End: 2021-11-22
Payer: MEDICAID

## 2021-11-22 ENCOUNTER — HOSPITAL ENCOUNTER (OUTPATIENT)
Dept: RADIOLOGY | Facility: HOSPITAL | Age: 51
Discharge: HOME | End: 2021-11-22
Attending: NURSE PRACTITIONER
Payer: MEDICAID

## 2021-11-22 VITALS — WEIGHT: 258.38 LBS | HEIGHT: 71 IN | BODY MASS INDEX: 36.17 KG/M2

## 2021-11-22 DIAGNOSIS — S82.852D CLOSED DISPLACED TRIMALLEOLAR FRACTURE OF LEFT ANKLE WITH ROUTINE HEALING, SUBSEQUENT ENCOUNTER: Primary | ICD-10-CM

## 2021-11-22 DIAGNOSIS — S82.852D CLOSED DISPLACED TRIMALLEOLAR FRACTURE OF LEFT ANKLE WITH ROUTINE HEALING, SUBSEQUENT ENCOUNTER: ICD-10-CM

## 2021-11-22 PROCEDURE — 99214 PR OFFICE/OUTPT VISIT, EST, LEVL IV, 30-39 MIN: ICD-10-PCS | Mod: S$PBB,,, | Performed by: NURSE PRACTITIONER

## 2021-11-22 PROCEDURE — 99999 PR PBB SHADOW E&M-EST. PATIENT-LVL III: ICD-10-PCS | Mod: PBBFAC,,, | Performed by: NURSE PRACTITIONER

## 2021-11-22 PROCEDURE — 73610 XR ANKLE COMPLETE 3 VIEW LEFT: ICD-10-PCS | Mod: 26,LT,, | Performed by: RADIOLOGY

## 2021-11-22 PROCEDURE — 99213 OFFICE O/P EST LOW 20 MIN: CPT | Mod: PBBFAC | Performed by: NURSE PRACTITIONER

## 2021-11-22 PROCEDURE — 99214 OFFICE O/P EST MOD 30 MIN: CPT | Mod: S$PBB,,, | Performed by: NURSE PRACTITIONER

## 2021-11-22 PROCEDURE — 73610 X-RAY EXAM OF ANKLE: CPT | Mod: 26,LT,, | Performed by: RADIOLOGY

## 2021-11-22 PROCEDURE — 99999 PR PBB SHADOW E&M-EST. PATIENT-LVL III: CPT | Mod: PBBFAC,,, | Performed by: NURSE PRACTITIONER

## 2021-11-22 PROCEDURE — 73610 X-RAY EXAM OF ANKLE: CPT | Mod: TC,LT

## 2021-11-22 RX ORDER — IBUPROFEN 600 MG/1
600 TABLET ORAL EVERY 8 HOURS PRN
Qty: 30 TABLET | Refills: 0 | Status: SHIPPED | OUTPATIENT
Start: 2021-11-22 | End: 2022-05-02

## 2021-11-23 ENCOUNTER — TELEPHONE (OUTPATIENT)
Dept: ORTHOPEDICS | Facility: CLINIC | Age: 51
End: 2021-11-23
Payer: MEDICAID

## 2022-01-04 ENCOUNTER — CLINICAL SUPPORT (OUTPATIENT)
Dept: REHABILITATION | Facility: HOSPITAL | Age: 52
End: 2022-01-04
Payer: MEDICAID

## 2022-01-04 DIAGNOSIS — M25.672 DECREASED RANGE OF MOTION OF LEFT ANKLE: ICD-10-CM

## 2022-01-04 DIAGNOSIS — Z74.09 IMPAIRED FUNCTIONAL MOBILITY, BALANCE, GAIT, AND ENDURANCE: ICD-10-CM

## 2022-01-04 PROCEDURE — 97110 THERAPEUTIC EXERCISES: CPT

## 2022-01-04 NOTE — PROGRESS NOTES
Physical Therapy Daily Treatment Note/Re-evaluation/Updated POC     Name: Naresh Lancaster  [facility] Number: [id number]    Therapy Diagnosis:   Encounter Diagnoses   Name Primary?    Decreased range of motion of left ankle     Impaired functional mobility, balance, gait, and endurance      Physician: Dayday Correia NP    Visit Date: 1/4/2022    Physician: Dayday Correia NP     Physician Orders: PT Eval and Treat: Weight bear as tolerated in cam boot x 3 weeks then transition into a lace up ankle brace as tolerates.  ROM as tolerates.  Medical Diagnosis from Referral: S82.852D (ICD-10-CM) - Closed displaced trimalleolar fracture of left ankle with routine healing, subsequent encounter  Evaluation Date: 8/25/2021  Authorization Period Expiration: 8/20/2022  Plan of Care Expiration: 8/25/2021 to 12/31/2021 (extend to 3/10/22)  Visit # / Visits authorized: 6/44 (2 previous)    Time In: 3:00 PM  Time Out: 3:58 PM  Total Billable Time: 58 minutes    Precautions: Standard    DOS: 5/28/2021     PROCEDURE:              Open reduction internal fixation left trimalleolar ankle fracture with fixation of posterior lip  Open reduction internal fixation left ankle syndesmosis     PHYSICAL THERAPY: 11/22/21  -PT/OT - continue therapy with [facility] as ordered.  Weight bear as tolerated.   - ROM as tolerated    Subjective     Pt reports: His ankle has been doing alright. He reports his ankle still gets swollen and feels weak and feels like he needs to exercise his ankle. Was out of town for 2 months so had to stop coming. Was continuing to complete HEP   He was compliant with home exercise program.  Response to previous treatment: Soreness the next day, pain the subsequent day.   Functional change: Short distance ambulation without AD, long distance with SPC    Pain: 0/10 (4/10 after prolonged walking)  Location: left medial ankle     Objective   Observation: significant left calf atrophy  Gait: decreased WB through LLE,  "decreased toe off, decreased DF    Functional Tests:   SLS EO: L 5 seconds, right 30s seconds  SLS EC: n/t  OH Squat: decreased DF on LLE  Single leg squat: R n/t ; L n/t  Single leg calf raise: R n/t ; L n/t      Ankle Active Range of Motion:   Ankle Right Left   DF 15 5   Plantarflexion 50 35   Inversion 35 25   Eversion 25 20      Ankle Passive Range of Motion:   Ankle Right Left   DF (knee extended) 15 10   DF (knee bent) 15 10   Plantarflexion 50 40   Inversion 35 30   Eversion 25 20     Knee Passive Range of Motion:   Right  Left    Flexion 125 125   Extension 10 hyper ext 10 hyperext     Lower Extremity Strength   Right  Left    Dorsiflexion 5/5 5/5   Plantarflexion (standing) 5/5 4/5   Inversion 5/5 4+/5   Eversion 5/5 5/5   Hip extension 4+/5 4+/5   PGM 4+/5 4/5     Joint Mobility: decreased A/P talocrural joint mobility, decreased calcaneal mobility      Palpation: TTP along lateral malleolus, slight decreased sensation along medial incision      Neural provocation:  n/t      Naresh received therapeutic exercises to develop strength, endurance and ROM for 45 minutes including:  Objective measures taken today 30 min  Upright Bike: 8 minutes, Level 5.0: np  Self-DF on 20" box: 3x10, 10" holds, following mobilization with movement  Seated Heel Raises 10#KB: 30x  Standing calf raise 30x  Monster walks YTB at midfoot 2 laps  Split stance paloff press 3x10 to improve SLS          Not Performed Today:  Towel Scrunches: 2 minutes  BAPS board: Level 3.0   30x forward/backward   30x side-to-side   30x clockwise   30x counterclockwise  Hammerstrength Knee Extension: 4x10, 5#-10#  Patient education  4-way ankle: 30x, BTB, LLE  DL Leg Press: 15x, 80#  SL Leg Press: 3x10, 40#, LLE only  Forward Step-ups: 3x10, 4" step, LLE leading  DL Glute Bridges: 3x10, 5" holds        Naresh received the following manual therapy techniques: Joint mobilizations were applied to the L ankle/foot for 13 minutes including:   Posterior TC " glides  Medial and Lateral Calcaneal glides  Mobilization with movement w/ posterior TC glides to improve       Home Exercises Provided and Patient Education Provided     Education provided:   - issued hep    Written Home Exercises Provided: yes.  Exercises were reviewed and Naresh was able to demonstrate them prior to the end of the session.  Naresh demonstrated good  understanding of the education provided.     See EMR under Patient Instructions for exercises provided 9/10/2021.    Assessment   Re-assessment performed today with patient demonstrating continued ROM, mobility, strength, and motor control deficits limiting performance during functional activities and gait. Patient would benefit from continued PT in order to address the before-mentioned deficits in order to improve CLOF and performance during functional activities. Continue to progress incorporating functional activities as tolerated in order to meet previously established goals. Updated HEP issued today    Naresh is progressing well towards his goals.   Pt prognosis is Good.     Pt will continue to benefit from skilled outpatient physical therapy to address the deficits listed in the problem list box on initial evaluation, provide pt/family education and to maximize pt's level of independence in the home and community environment.     Pt's spiritual, cultural and educational needs considered and pt agreeable to plan of care and goals.     Anticipated barriers to physical therapy: none    Goals:   Short Term Goals (6 Weeks):   1. Pt will be independent with HEP to supplement PT in improving functional mobility. MET  2. Pt will improve L DF ROM with knee extended to greater than or equal to neutral to improve gait mechanics. Progressing, Not Met  3. Pt will improve L ankle manual muscle tests to greater than or equal to 3/5 to improve improve gait mechanics. Progressing, Not Met  4. Pt will walk greater than or equal to 100 feet, with good gait  mechanics in order to improve ability to ambulate within the home with assistive device. Progressing, Not Met     Mid-Term Goals (12 Weeks):   1. Pt will be independent with HEP to supplement PT in improving functional mobility. Progressing, Not Met  2. Pt will improve L DF ROM with knee extended to greater than or equal to R DF ROM to improve gait mechanics. Progressing, Not Met  3. Pt will improve SLS on L foot to greater than or equal to 30 seconds to improve strength of ankle intrinsics. Progressing, Not Met  4. Pt will improve L ankle manual muscle tests to greater than or equal to 43+/5 to improve gait mechanics. Progressing, Not Met  5. Pt will walk greater than or equal to 1000 feet, pain free, in order to improve ability to ambulate within the community. Progressing, Not Met     Long Term Goals (16 Weeks):   1. Pt will be independent with updated HEP to supplement PT in improving functional mobility. Progressing, Not Met  2. Pt will improve FOTO score to </= 53% limited to decrease perceived limitation with mobility. Progressing, Not Met  3. Pt will improve impaired LE strength to >/= 4+/5 to improve strength for functional tasks. Progressing, Not Met  4. 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     Plan of care Certification: extend to 3/10/22     Outpatient Physical Therapy 2 times weekly for 10 weeks to include the following interventions: Electrical Stimulation , FDN prn, Gait Training, Manual Therapy, Moist Heat/ Ice, Neuromuscular Re-ed, Patient Education, Therapeutic Activites, Therapeutic Exercise, Ultrasound and Kinesiotape prn.     DARIUS STEIN, PT

## 2022-01-04 NOTE — PLAN OF CARE
Physical Therapy Daily Treatment Note/Re-evaluation/Updated POC     Name: Naresh Lancaster  [facility] Number: [id number]    Therapy Diagnosis:   Encounter Diagnoses   Name Primary?    Decreased range of motion of left ankle     Impaired functional mobility, balance, gait, and endurance      Physician: Dayday Correia NP    Visit Date: 1/4/2022    Physician: Dayday Correia NP     Physician Orders: PT Eval and Treat: Weight bear as tolerated in cam boot x 3 weeks then transition into a lace up ankle brace as tolerates.  ROM as tolerates.  Medical Diagnosis from Referral: S82.852D (ICD-10-CM) - Closed displaced trimalleolar fracture of left ankle with routine healing, subsequent encounter  Evaluation Date: 8/25/2021  Authorization Period Expiration: 8/20/2022  Plan of Care Expiration: 8/25/2021 to 12/31/2021 (extend to 3/10/22)  Visit # / Visits authorized: 6/44 (2 previous)    Time In: 3:00 PM  Time Out: 3:58 PM  Total Billable Time: 58 minutes    Precautions: Standard    DOS: 5/28/2021     PROCEDURE:              Open reduction internal fixation left trimalleolar ankle fracture with fixation of posterior lip  Open reduction internal fixation left ankle syndesmosis     PHYSICAL THERAPY: 11/22/21  -PT/OT - continue therapy with [facility] as ordered.  Weight bear as tolerated.   - ROM as tolerated    Subjective     Pt reports: His ankle has been doing alright. He reports his ankle still gets swollen and feels weak and feels like he needs to exercise his ankle. Was out of town for 2 months so had to stop coming. Was continuing to complete HEP   He was compliant with home exercise program.  Response to previous treatment: Soreness the next day, pain the subsequent day.   Functional change: Short distance ambulation without AD, long distance with SPC    Pain: 0/10 (4/10 after prolonged walking)  Location: left medial ankle     Objective   Observation: significant left calf atrophy  Gait: decreased WB through LLE,  "decreased toe off, decreased DF    Functional Tests:   SLS EO: L 5 seconds, right 30s seconds  SLS EC: n/t  OH Squat: decreased DF on LLE  Single leg squat: R n/t ; L n/t  Single leg calf raise: R n/t ; L n/t      Ankle Active Range of Motion:   Ankle Right Left   DF 15 5   Plantarflexion 50 35   Inversion 35 25   Eversion 25 20      Ankle Passive Range of Motion:   Ankle Right Left   DF (knee extended) 15 10   DF (knee bent) 15 10   Plantarflexion 50 40   Inversion 35 30   Eversion 25 20     Knee Passive Range of Motion:   Right  Left    Flexion 125 125   Extension 10 hyper ext 10 hyperext     Lower Extremity Strength   Right  Left    Dorsiflexion 5/5 5/5   Plantarflexion (standing) 5/5 4/5   Inversion 5/5 4+/5   Eversion 5/5 5/5   Hip extension 4+/5 4+/5   PGM 4+/5 4/5     Joint Mobility: decreased A/P talocrural joint mobility, decreased calcaneal mobility      Palpation: TTP along lateral malleolus, slight decreased sensation along medial incision      Neural provocation:  n/t      Naresh received therapeutic exercises to develop strength, endurance and ROM for 45 minutes including:  Objective measures taken today 30 min  Upright Bike: 8 minutes, Level 5.0: np  Self-DF on 20" box: 3x10, 10" holds, following mobilization with movement  Seated Heel Raises 10#KB: 30x  Standing calf raise 30x  Monster walks YTB at midfoot 2 laps  Split stance paloff press 3x10 to improve SLS          Not Performed Today:  Towel Scrunches: 2 minutes  BAPS board: Level 3.0   30x forward/backward   30x side-to-side   30x clockwise   30x counterclockwise  Hammerstrength Knee Extension: 4x10, 5#-10#  Patient education  4-way ankle: 30x, BTB, LLE  DL Leg Press: 15x, 80#  SL Leg Press: 3x10, 40#, LLE only  Forward Step-ups: 3x10, 4" step, LLE leading  DL Glute Bridges: 3x10, 5" holds        Naresh received the following manual therapy techniques: Joint mobilizations were applied to the L ankle/foot for 13 minutes including:   Posterior TC " glides  Medial and Lateral Calcaneal glides  Mobilization with movement w/ posterior TC glides to improve       Home Exercises Provided and Patient Education Provided     Education provided:   - issued hep    Written Home Exercises Provided: yes.  Exercises were reviewed and Naresh was able to demonstrate them prior to the end of the session.  Naresh demonstrated good  understanding of the education provided.     See EMR under Patient Instructions for exercises provided 9/10/2021.    Assessment   Re-assessment performed today with patient demonstrating continued ROM, mobility, strength, and motor control deficits limiting performance during functional activities and gait. Patient would benefit from continued PT in order to address the before-mentioned deficits in order to improve CLOF and performance during functional activities. Continue to progress incorporating functional activities as tolerated in order to meet previously established goals. Updated HEP issued today    Naresh is progressing well towards his goals.   Pt prognosis is Good.     Pt will continue to benefit from skilled outpatient physical therapy to address the deficits listed in the problem list box on initial evaluation, provide pt/family education and to maximize pt's level of independence in the home and community environment.     Pt's spiritual, cultural and educational needs considered and pt agreeable to plan of care and goals.     Anticipated barriers to physical therapy: none    Goals:   Short Term Goals (6 Weeks):   1. Pt will be independent with HEP to supplement PT in improving functional mobility. MET  2. Pt will improve L DF ROM with knee extended to greater than or equal to neutral to improve gait mechanics. Progressing, Not Met  3. Pt will improve L ankle manual muscle tests to greater than or equal to 3/5 to improve improve gait mechanics. Progressing, Not Met  4. Pt will walk greater than or equal to 100 feet, with good gait  mechanics in order to improve ability to ambulate within the home with assistive device. Progressing, Not Met     Mid-Term Goals (12 Weeks):   1. Pt will be independent with HEP to supplement PT in improving functional mobility. Progressing, Not Met  2. Pt will improve L DF ROM with knee extended to greater than or equal to R DF ROM to improve gait mechanics. Progressing, Not Met  3. Pt will improve SLS on L foot to greater than or equal to 30 seconds to improve strength of ankle intrinsics. Progressing, Not Met  4. Pt will improve L ankle manual muscle tests to greater than or equal to 43+/5 to improve gait mechanics. Progressing, Not Met  5. Pt will walk greater than or equal to 1000 feet, pain free, in order to improve ability to ambulate within the community. Progressing, Not Met     Long Term Goals (16 Weeks):   1. Pt will be independent with updated HEP to supplement PT in improving functional mobility. Progressing, Not Met  2. Pt will improve FOTO score to </= 53% limited to decrease perceived limitation with mobility. Progressing, Not Met  3. Pt will improve impaired LE strength to >/= 4+/5 to improve strength for functional tasks. Progressing, Not Met  4. 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     Plan of care Certification: extend to 3/10/22     Outpatient Physical Therapy 2 times weekly for 10 weeks to include the following interventions: Electrical Stimulation , FDN prn, Gait Training, Manual Therapy, Moist Heat/ Ice, Neuromuscular Re-ed, Patient Education, Therapeutic Activites, Therapeutic Exercise, Ultrasound and Kinesiotape prn.     DARIUS STEIN, PT

## 2022-01-12 ENCOUNTER — LAB VISIT (OUTPATIENT)
Dept: PRIMARY CARE CLINIC | Facility: CLINIC | Age: 52
End: 2022-01-12
Payer: MEDICAID

## 2022-01-12 DIAGNOSIS — Z20.822 CONTACT WITH AND (SUSPECTED) EXPOSURE TO COVID-19: ICD-10-CM

## 2022-01-12 LAB
CTP QC/QA: YES
SARS-COV-2 AG RESP QL IA.RAPID: NEGATIVE

## 2022-01-12 PROCEDURE — 87811 SARS-COV-2 COVID19 W/OPTIC: CPT

## 2022-01-13 ENCOUNTER — CLINICAL SUPPORT (OUTPATIENT)
Dept: REHABILITATION | Facility: HOSPITAL | Age: 52
End: 2022-01-13
Attending: NURSE PRACTITIONER
Payer: MEDICAID

## 2022-01-13 DIAGNOSIS — Z74.09 IMPAIRED FUNCTIONAL MOBILITY, BALANCE, GAIT, AND ENDURANCE: ICD-10-CM

## 2022-01-13 DIAGNOSIS — M25.672 DECREASED RANGE OF MOTION OF LEFT ANKLE: ICD-10-CM

## 2022-01-13 PROCEDURE — 97110 THERAPEUTIC EXERCISES: CPT

## 2022-01-13 NOTE — PROGRESS NOTES
Physical Therapy Daily Treatment Note     Name: Naresh Lancaster  [facility] Number: [id number]    Therapy Diagnosis:   Encounter Diagnoses   Name Primary?    Decreased range of motion of left ankle     Impaired functional mobility, balance, gait, and endurance      Physician: Dayday Correia NP    Visit Date: 1/13/2022    Physician: Dayday Correia NP     Physician Orders: PT Eval and Treat: Weight bear as tolerated in cam boot x 3 weeks then transition into a lace up ankle brace as tolerates.  ROM as tolerates.  Medical Diagnosis from Referral: S82.852D (ICD-10-CM) - Closed displaced trimalleolar fracture of left ankle with routine healing, subsequent encounter  Evaluation Date: 8/25/2021  Authorization Period Expiration: 8/20/2022  Plan of Care Expiration: 8/25/2021 to 3/10/22  Visit # / Visits authorized: 2/30     Time In: 3:00 PM  Time Out: 3:54 PM  Total Billable Time: 54 minutes  All billed as there-ex per medicaid guidelines    Precautions: Standard    DOS: 5/28/2021     PROCEDURE:              Open reduction internal fixation left trimalleolar ankle fracture with fixation of posterior lip  Open reduction internal fixation left ankle syndesmosis     PHYSICAL THERAPY: 11/22/21  -PT/OT - continue therapy with [facility] as ordered.  Weight bear as tolerated.   - ROM as tolerated    Subjective     Pt reports: He's been doing alright. Reports significant pinching in the front of his ankle upon arrival with walking   He was compliant with home exercise program.  Response to previous treatment: Soreness the next day, pain the subsequent day.   Functional change: Short distance ambulation without AD, long distance with SPC    Pain: 0/10 (4/10 after prolonged walking)  Location: left medial ankle     Objective     Ankle Active Range of Motion:   Ankle Right Left   DF 15 5   Plantarflexion 50 35   Inversion 35 25   Eversion 25 20       Naresh received therapeutic exercises to develop strength, endurance and ROM  "for 43 minutes including:  Upright Bike: 8 minutes, Level 5.0  Self-DF on 20" box: 3x10, 10" holds  Seated Heel Raises 10#KB: 30x  SL shuttle squat: 3x10 50#  Monster walks YTB at midfoot 2 laps  Split stance paloff press 3x10 to improve SLS    DL box squats 24" box 3x10          Not Performed Today:  Towel Scrunches: 2 minutes  BAPS board: Level 3.0   30x forward/backward   30x side-to-side   30x clockwise   30x counterclockwise  Hammerstrength Knee Extension: 4x10, 5#-10#  Patient education  4-way ankle: 30x, BTB, LLE  DL Leg Press: 15x, 80#  SL Leg Press: 3x10, 40#, LLE only  Forward Step-ups: 3x10, 4" step, LLE leading  DL Glute Bridges: 3x10, 5" holds        Naresh received the following manual therapy techniques: Joint mobilizations were applied to the L ankle/foot for 11 minutes including:   Posterior TC glides  Medial and Lateral Calcaneal glides      Home Exercises Provided and Patient Education Provided     Education provided:   - issued hep    Written Home Exercises Provided: yes.  Exercises were reviewed and Naresh was able to demonstrate them prior to the end of the session.  Naresh demonstrated good  understanding of the education provided.     See EMR under Patient Instructions for exercises provided 9/10/2021.    Assessment   Patient completed session as noted above with complete resolution of anterior pinching following manual and self DF. Pt continues to have limited talocrural posterior joint mobility upon arrival to session contributing to anterior ankle discomfort. Progressed single leg stability activities as noted above. Sig atrophy noted of L calf musculature. Continue to progress as tolerated in order to improve ambulation ability.    Naresh is progressing well towards his goals.   Pt prognosis is Good.     Pt will continue to benefit from skilled outpatient physical therapy to address the deficits listed in the problem list box on initial evaluation, provide pt/family education and to " maximize pt's level of independence in the home and community environment.     Pt's spiritual, cultural and educational needs considered and pt agreeable to plan of care and goals.     Anticipated barriers to physical therapy: none    Goals:   Short Term Goals (6 Weeks):   1. Pt will be independent with HEP to supplement PT in improving functional mobility. MET  2. Pt will improve L DF ROM with knee extended to greater than or equal to neutral to improve gait mechanics. Progressing, Not Met  3. Pt will improve L ankle manual muscle tests to greater than or equal to 3/5 to improve improve gait mechanics. Progressing, Not Met  4. Pt will walk greater than or equal to 100 feet, with good gait mechanics in order to improve ability to ambulate within the home with assistive device. Progressing, Not Met     Mid-Term Goals (12 Weeks):   1. Pt will be independent with HEP to supplement PT in improving functional mobility. Progressing, Not Met  2. Pt will improve L DF ROM with knee extended to greater than or equal to R DF ROM to improve gait mechanics. Progressing, Not Met  3. Pt will improve SLS on L foot to greater than or equal to 30 seconds to improve strength of ankle intrinsics. Progressing, Not Met  4. Pt will improve L ankle manual muscle tests to greater than or equal to 43+/5 to improve gait mechanics. Progressing, Not Met  5. Pt will walk greater than or equal to 1000 feet, pain free, in order to improve ability to ambulate within the community. Progressing, Not Met     Long Term Goals (16 Weeks):   1. Pt will be independent with updated HEP to supplement PT in improving functional mobility. Progressing, Not Met  2. Pt will improve FOTO score to </= 53% limited to decrease perceived limitation with mobility. Progressing, Not Met  3. Pt will improve impaired LE strength to >/= 4+/5 to improve strength for functional tasks. Progressing, Not Met  4. Pt will return to PLOF, with no pain, in order to return to  [employer] business. 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     Plan of care Certification: extend to 3/10/22     Outpatient Physical Therapy 2 times weekly for 10 weeks to include the following interventions: Electrical Stimulation , FDN prn, Gait Training, Manual Therapy, Moist Heat/ Ice, Neuromuscular Re-ed, Patient Education, Therapeutic Activites, Therapeutic Exercise, Ultrasound and Kinesiotape prn.     DARIUS STEIN, PT

## 2022-01-18 ENCOUNTER — DOCUMENTATION ONLY (OUTPATIENT)
Dept: REHABILITATION | Facility: HOSPITAL | Age: 52
End: 2022-01-18
Payer: MEDICAID

## 2022-01-18 NOTE — PROGRESS NOTES
Pt called regarding missed appointment on 1/18/22 at 4:00. Left VM reminding patient of next appointment on 1/20 2:00 PM and informed that [facility] no[facility]show cancel policy if there are more than 3 no shows/canceled they will be removed from the schedule and will have to call back to be rescheduled.     Rojelio Antonio, PT, DPT

## 2022-01-20 ENCOUNTER — CLINICAL SUPPORT (OUTPATIENT)
Dept: REHABILITATION | Facility: HOSPITAL | Age: 52
End: 2022-01-20
Payer: MEDICAID

## 2022-01-20 DIAGNOSIS — M25.672 DECREASED RANGE OF MOTION OF LEFT ANKLE: ICD-10-CM

## 2022-01-20 DIAGNOSIS — Z74.09 IMPAIRED FUNCTIONAL MOBILITY, BALANCE, GAIT, AND ENDURANCE: ICD-10-CM

## 2022-01-20 PROCEDURE — 97110 THERAPEUTIC EXERCISES: CPT

## 2022-01-20 NOTE — PROGRESS NOTES
Physical Therapy Daily Treatment Note     Name: Naresh Lancaster  [facility] Number: [id number]    Therapy Diagnosis:   Encounter Diagnoses   Name Primary?    Decreased range of motion of left ankle     Impaired functional mobility, balance, gait, and endurance      Physician: Dayday Correia NP    Visit Date: 1/20/2022    Physician: Dayday Correia NP     Physician Orders: PT Eval and Treat: Weight bear as tolerated in cam boot x 3 weeks then transition into a lace up ankle brace as tolerates.  ROM as tolerates.  Medical Diagnosis from Referral: S82.852D (ICD-10-CM) - Closed displaced trimalleolar fracture of left ankle with routine healing, subsequent encounter  Evaluation Date: 8/25/2021  Authorization Period Expiration: 8/20/2022  Plan of Care Expiration: 8/25/2021 to 3/10/22  Visit # / Visits authorized: 3/30     Time In: 1:10 PM  Time Out: 2:08 PM  Total Billable Time: 58 minutes  All billed as there-ex per medicaid guidelines    Precautions: Standard    DOS: 5/28/2021     PROCEDURE:              Open reduction internal fixation left trimalleolar ankle fracture with fixation of posterior lip  Open reduction internal fixation left ankle syndesmosis     PHYSICAL THERAPY: 11/22/21  -PT/OT - continue therapy with [facility] as ordered.  Weight bear as tolerated.   - ROM as tolerated    Subjective     Pt reports: Been doing alright. Not getting the pinching in the front of his ankle like he was prior to last session   He was compliant with home exercise program.  Response to previous treatment: Soreness the next day, pain the subsequent day.   Functional change: Short distance ambulation without AD, long distance with SPC    Pain: 0/10 (4/10 after prolonged walking)  Location: left medial ankle     Objective     Ankle Active Range of Motion:   Ankle Right Left   DF 15 5   Plantarflexion 50 35   Inversion 35 25   Eversion 25 20       Naresh received therapeutic exercises to develop strength, endurance and ROM  "for 48 minutes including:  Upright Bike: 8 minutes, Level 5.0  Self-DF on 16" box: 3x10, 10" holds  Seated Heel Raises 25#KB: 30x  SL shuttle squat: 3x10 50#  Monster walks YTB at midfoot 2 laps  + modified SLS tramp toss 20x3  Lateral step down 3x10 6" box  Standing calf raise 30x 2s hold  Gait training 5'      Naresh received the following manual therapy techniques: Joint mobilizations were applied to the L ankle/foot for 10 minutes including:   Posterior TC glides  Medial and Lateral Calcaneal glides      Home Exercises Provided and Patient Education Provided     Education provided:   - issued hep    Written Home Exercises Provided: yes.  Exercises were reviewed and Naresh was able to demonstrate them prior to the end of the session.  Naresh demonstrated good  understanding of the education provided.     See EMR under Patient Instructions for exercises provided 9/10/2021.    Assessment   Patient completed session as noted above with significant improvements noted with DF mobility.. Progressed single leg stability activities as noted above with noted fatigue towards end of treatment session. Continue to progress as tolerated in order to improve ambulation ability.    Naresh is progressing well towards his goals.   Pt prognosis is Good.     Pt will continue to benefit from skilled outpatient physical therapy to address the deficits listed in the problem list box on initial evaluation, provide pt/family education and to maximize pt's level of independence in the home and community environment.     Pt's spiritual, cultural and educational needs considered and pt agreeable to plan of care and goals.     Anticipated barriers to physical therapy: none    Goals:   Short Term Goals (6 Weeks):   1. Pt will be independent with HEP to supplement PT in improving functional mobility. MET  2. Pt will improve L DF ROM with knee extended to greater than or equal to neutral to improve gait mechanics. Progressing, Not Met  3. Pt " will improve L ankle manual muscle tests to greater than or equal to 3/5 to improve improve gait mechanics. Progressing, Not Met  4. Pt will walk greater than or equal to 100 feet, with good gait mechanics in order to improve ability to ambulate within the home with assistive device. Progressing, Not Met     Mid-Term Goals (12 Weeks):   1. Pt will be independent with HEP to supplement PT in improving functional mobility. Progressing, Not Met  2. Pt will improve L DF ROM with knee extended to greater than or equal to R DF ROM to improve gait mechanics. Progressing, Not Met  3. Pt will improve SLS on L foot to greater than or equal to 30 seconds to improve strength of ankle intrinsics. Progressing, Not Met  4. Pt will improve L ankle manual muscle tests to greater than or equal to 43+/5 to improve gait mechanics. Progressing, Not Met  5. Pt will walk greater than or equal to 1000 feet, pain free, in order to improve ability to ambulate within the community. Progressing, Not Met     Long Term Goals (16 Weeks):   1. Pt will be independent with updated HEP to supplement PT in improving functional mobility. Progressing, Not Met  2. Pt will improve FOTO score to </= 53% limited to decrease perceived limitation with mobility. Progressing, Not Met  3. Pt will improve impaired LE strength to >/= 4+/5 to improve strength for functional tasks. Progressing, Not Met  4. 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     Plan of care Certification: extend to 3/10/22     Outpatient Physical Therapy 2 times weekly for 10 weeks to include the following interventions: Electrical Stimulation , FDN prn, Gait Training, Manual Therapy, Moist Heat/ Ice, Neuromuscular Re-ed, Patient Education, Therapeutic Activites, Therapeutic Exercise, Ultrasound and  Kinesiotape prn.     DARIUS STEIN, PT

## 2022-01-25 ENCOUNTER — CLINICAL SUPPORT (OUTPATIENT)
Dept: REHABILITATION | Facility: HOSPITAL | Age: 52
End: 2022-01-25
Payer: MEDICAID

## 2022-01-25 DIAGNOSIS — M25.672 DECREASED RANGE OF MOTION OF LEFT ANKLE: ICD-10-CM

## 2022-01-25 DIAGNOSIS — Z74.09 IMPAIRED FUNCTIONAL MOBILITY, BALANCE, GAIT, AND ENDURANCE: ICD-10-CM

## 2022-01-25 PROCEDURE — 97110 THERAPEUTIC EXERCISES: CPT

## 2022-01-25 NOTE — PROGRESS NOTES
"  Physical Therapy Daily Treatment Note     Name: Naresh Lancaster  [facility] Number: [id number]    Therapy Diagnosis:   Encounter Diagnoses   Name Primary?    Decreased range of motion of left ankle     Impaired functional mobility, balance, gait, and endurance      Physician: Dayday Correia NP    Visit Date: 1/25/2022    Physician: Dayday Correia NP     Physician Orders: PT Eval and Treat: Weight bear as tolerated in cam boot x 3 weeks then transition into a lace up ankle brace as tolerates.  ROM as tolerates.  Medical Diagnosis from Referral: S82.852D (ICD-10-CM) - Closed displaced trimalleolar fracture of left ankle with routine healing, subsequent encounter  Evaluation Date: 8/25/2021  Authorization Period Expiration: 8/20/2022  Plan of Care Expiration: 8/25/2021 to 3/10/22  Visit # / Visits authorized: 4/30     Time In: 1:05 PM  Time Out: 2:00 PM  Total Billable Time: 55 minutes  All billed as there-ex per medicaid guidelines    Precautions: Standard    DOS: 5/28/2021     PROCEDURE:              Open reduction internal fixation left trimalleolar ankle fracture with fixation of posterior lip  Open reduction internal fixation left ankle syndesmosis     PHYSICAL THERAPY: 11/22/21  -PT/OT - continue therapy with [facility] as ordered.  Weight bear as tolerated.   - ROM as tolerated    Subjective     Pt reports: Ankle is feeling good. A little sore but a "good" soreness. No more pinching in front of ankle   He was compliant with home exercise program.  Response to previous treatment: Soreness the next day,   Functional change: Improved walking tolerance    Pain: 0/10   Location: left medial ankle     Objective     Ankle Active Range of Motion:   Ankle Right Left   DF 15 5   Plantarflexion 50 35   Inversion 35 25   Eversion 25 20       Naresh received therapeutic exercises to develop strength, endurance and ROM for 45 minutes including:  Upright Bike: 8 minutes, Level 5.0  Self-DF on 16" box: 3x10, 10" " "holds  Seated Heel Raises 25#KB: 30x  DL 1" incline calf raise 4x8   SL shuttle squat: 3x10 62.5#  Monster walks YTB at midfoot 2 laps  Modified SLS: 10# DB pass 3x20  Lateral step down 4x10 6" box      Naresh received the following manual therapy techniques: Joint mobilizations were applied to the L ankle/foot for 10 minutes including:   Posterior TC glides  Medial and Lateral Calcaneal glides      Home Exercises Provided and Patient Education Provided     Education provided:   - issued hep    Written Home Exercises Provided: yes.  Exercises were reviewed and Naresh was able to demonstrate them prior to the end of the session.  Naresh demonstrated good  understanding of the education provided.     See EMR under Patient Instructions for exercises provided 9/10/2021.    Assessment   Patient completed session as noted above with significant improvements noted with DF mobility. Following manual therapy and self DF mobilization. Progressed single leg stability activities as noted above with noted fatigue towards end of treatment session. Progressed functional strengthening to include DL calf raise with good performance from patient. Slight tendency to offload LLE. Continue to progress as tolerated in order to improve ambulation ability.    Naresh is progressing well towards his goals.   Pt prognosis is Good.     Pt will continue to benefit from skilled outpatient physical therapy to address the deficits listed in the problem list box on initial evaluation, provide pt/family education and to maximize pt's level of independence in the home and community environment.     Pt's spiritual, cultural and educational needs considered and pt agreeable to plan of care and goals.     Anticipated barriers to physical therapy: none    Goals:   Short Term Goals (6 Weeks):   1. Pt will be independent with HEP to supplement PT in improving functional mobility. MET  2. Pt will improve L DF ROM with knee extended to greater than or equal " to neutral to improve gait mechanics. Progressing, Not Met  3. Pt will improve L ankle manual muscle tests to greater than or equal to 3/5 to improve improve gait mechanics. Progressing, Not Met  4. Pt will walk greater than or equal to 100 feet, with good gait mechanics in order to improve ability to ambulate within the home with assistive device. Progressing, Not Met     Mid-Term Goals (12 Weeks):   1. Pt will be independent with HEP to supplement PT in improving functional mobility. Progressing, Not Met  2. Pt will improve L DF ROM with knee extended to greater than or equal to R DF ROM to improve gait mechanics. Progressing, Not Met  3. Pt will improve SLS on L foot to greater than or equal to 30 seconds to improve strength of ankle intrinsics. Progressing, Not Met  4. Pt will improve L ankle manual muscle tests to greater than or equal to 43+/5 to improve gait mechanics. Progressing, Not Met  5. Pt will walk greater than or equal to 1000 feet, pain free, in order to improve ability to ambulate within the community. Progressing, Not Met     Long Term Goals (16 Weeks):   1. Pt will be independent with updated HEP to supplement PT in improving functional mobility. Progressing, Not Met  2. Pt will improve FOTO score to </= 53% limited to decrease perceived limitation with mobility. Progressing, Not Met  3. Pt will improve impaired LE strength to >/= 4+/5 to improve strength for functional tasks. Progressing, Not Met  4. 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     Plan of care Certification: extend to 3/10/22     Outpatient Physical Therapy 2 times weekly for 10 weeks to include the following interventions: Electrical Stimulation , FDN prn, Gait Training, Manual Therapy, Moist Heat/ Ice, Neuromuscular Re-ed, Patient Education,  Therapeutic Activites, Therapeutic Exercise, Ultrasound and Kinesiotape prn.     DARIUS STEIN, PT

## 2022-02-01 ENCOUNTER — CLINICAL SUPPORT (OUTPATIENT)
Dept: REHABILITATION | Facility: HOSPITAL | Age: 52
End: 2022-02-01
Payer: MEDICAID

## 2022-02-01 DIAGNOSIS — Z74.09 IMPAIRED FUNCTIONAL MOBILITY, BALANCE, GAIT, AND ENDURANCE: ICD-10-CM

## 2022-02-01 DIAGNOSIS — M25.672 DECREASED RANGE OF MOTION OF LEFT ANKLE: ICD-10-CM

## 2022-02-01 PROCEDURE — 97110 THERAPEUTIC EXERCISES: CPT

## 2022-02-01 NOTE — PROGRESS NOTES
"  Physical Therapy Daily Treatment Note     Name: Naresh Lancaster  [facility] [place] Number: [id number]    Therapy Diagnosis:   Encounter Diagnoses   Name Primary?    Decreased range of motion of left ankle     Impaired functional mobility, balance, gait, and endurance      Physician: Dayday Correia NP    Visit Date: 2/1/2022    Physician: Dayday Correia NP     Physician Orders: PT Eval and Treat: Weight bear as tolerated in cam boot x 3 weeks then transition into a lace up ankle brace as tolerates.  ROM as tolerates.  Medical Diagnosis from Referral: S82.852D (ICD-10-CM) - Closed displaced trimalleolar fracture of left ankle with routine healing, subsequent encounter  Evaluation Date: 8/25/2021  Authorization Period Expiration: 8/20/2022  Plan of Care Expiration: 8/25/2021 to 3/10/22  Visit # / Visits authorized: 5/30     Time In: 12:55 PM  Time Out: 1:55 PM  Total Billable Time: 60 minutes  All billed as there-ex per medicaid guidelines    Precautions: Standard    DOS: 5/28/2021     PROCEDURE:              Open reduction internal fixation left trimalleolar ankle fracture with fixation of posterior lip  Open reduction internal fixation left ankle syndesmosis     PHYSICAL THERAPY: 11/22/21  -PT/OT - continue therapy with [facility] as ordered.  Weight bear as tolerated.   - ROM as tolerated    Subjective     Pt reports: Getting a little pinching in the mary-lateral portion of my ankle.   He was compliant with home exercise program.  Response to previous treatment: Soreness the next day,   Functional change: Improved walking tolerance    Pain: 0/10   Location: left medial ankle     Objective     Ankle Active Range of Motion:   Ankle Right Left   DF 15 5   Plantarflexion 50 35   Inversion 35 25   Eversion 25 20       Naresh received therapeutic exercises to develop strength, endurance and ROM for 50 minutes including:  Upright Bike: 10 minutes, Level 5.0  Self-DF on 16" box: 20x, 10" holds  DL 1" incline calf raise " "4x8: increase weight shift onto LLE during descent  SL shuttle squat: 3x10 62.5#  Modified SLS: 15# DB pass 3x20  Lateral step down 4x10 6" box  Modified SL squat to 24" box 3x8  270 deg: Vasso strap sprinter stance static holds 30s x3    Not performed today  Monster walks YTB at midfoot 2 laps  Seated Heel Raises 25#KB: 30x:       Naresh received the following manual therapy techniques: Joint mobilizations were applied to the L ankle/foot for 10 minutes including:   Posterior TC glides  Medial and Lateral Calcaneal glides  Ankle PROM      Home Exercises Provided and Patient Education Provided     Education provided:   - issued hep    Written Home Exercises Provided: yes.  Exercises were reviewed and Naresh was able to demonstrate them prior to the end of the session.  Naresh demonstrated good  understanding of the education provided.     See EMR under Patient Instructions for exercises provided 9/10/2021.    Assessment   Patient with complete resolution of anterior/lateral ankle pinching following manual therapy and self DF mobilization to improve TC joint mobility. Progressed single leg stability activities as noted above with no complaints of pain throughout treatment session. Significant fatigue noted with DL calf raise with emphasis on increased weight shift onto LLE. Continue to progress as tolerated incorporating progressive strengthening and single leg stability activities as noted above      Naresh is progressing well towards his goals.   Pt prognosis is Good.     Pt will continue to benefit from skilled outpatient physical therapy to address the deficits listed in the problem list box on initial evaluation, provide pt/family education and to maximize pt's level of independence in the home and community environment.     Pt's spiritual, cultural and educational needs considered and pt agreeable to plan of care and goals.     Anticipated barriers to physical therapy: none    Goals:   Short Term Goals (6 " Weeks):   1. Pt will be independent with HEP to supplement PT in improving functional mobility. MET  2. Pt will improve L DF ROM with knee extended to greater than or equal to neutral to improve gait mechanics. Progressing, Not Met  3. Pt will improve L ankle manual muscle tests to greater than or equal to 3/5 to improve improve gait mechanics. Progressing, Not Met  4. Pt will walk greater than or equal to 100 feet, with good gait mechanics in order to improve ability to ambulate within the home with assistive device. Progressing, Not Met     Mid-Term Goals (12 Weeks):   1. Pt will be independent with HEP to supplement PT in improving functional mobility. Progressing, Not Met  2. Pt will improve L DF ROM with knee extended to greater than or equal to R DF ROM to improve gait mechanics. Progressing, Not Met  3. Pt will improve SLS on L foot to greater than or equal to 30 seconds to improve strength of ankle intrinsics. Progressing, Not Met  4. Pt will improve L ankle manual muscle tests to greater than or equal to 43+/5 to improve gait mechanics. Progressing, Not Met  5. Pt will walk greater than or equal to 1000 feet, pain free, in order to improve ability to ambulate within the community. Progressing, Not Met     Long Term Goals (16 Weeks):   1. Pt will be independent with updated HEP to supplement PT in improving functional mobility. Progressing, Not Met  2. Pt will improve FOTO score to </= 53% limited to decrease perceived limitation with mobility. Progressing, Not Met  3. Pt will improve impaired LE strength to >/= 4+/5 to improve strength for functional tasks. Progressing, Not Met  4. 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     Plan of care Certification: extend to 3/10/22     Outpatient Physical Therapy 2 times weekly for  10 weeks to include the following interventions: Electrical Stimulation , FDN prn, Gait Training, Manual Therapy, Moist Heat/ Ice, Neuromuscular Re-ed, Patient Education, Therapeutic Activites, Therapeutic Exercise, Ultrasound and Kinesiotape prn.     DARIUS STEIN, PT

## 2022-02-03 ENCOUNTER — CLINICAL SUPPORT (OUTPATIENT)
Dept: REHABILITATION | Facility: HOSPITAL | Age: 52
End: 2022-02-03
Payer: MEDICAID

## 2022-02-03 DIAGNOSIS — M25.672 DECREASED RANGE OF MOTION OF LEFT ANKLE: ICD-10-CM

## 2022-02-03 DIAGNOSIS — Z74.09 IMPAIRED FUNCTIONAL MOBILITY, BALANCE, GAIT, AND ENDURANCE: ICD-10-CM

## 2022-02-03 PROCEDURE — 97110 THERAPEUTIC EXERCISES: CPT

## 2022-02-03 NOTE — PROGRESS NOTES
"  Physical Therapy Daily Treatment Note     Name: Naresh Lancaster  [facility] Number: [id number]    Therapy Diagnosis:   Encounter Diagnoses   Name Primary?    Decreased range of motion of left ankle     Impaired functional mobility, balance, gait, and endurance      Physician: Dayday Correia NP    Visit Date: 2/3/2022    Physician: Dayday Correia NP     Physician Orders: PT Eval and Treat: Weight bear as tolerated in cam boot x 3 weeks then transition into a lace up ankle brace as tolerates.  ROM as tolerates.  Medical Diagnosis from Referral: S82.852D (ICD-10-CM) - Closed displaced trimalleolar fracture of left ankle with routine healing, subsequent encounter  Evaluation Date: 8/25/2021  Authorization Period Expiration: 8/20/2022  Plan of Care Expiration: 8/25/2021 to 3/10/22  Visit # / Visits authorized: 5/30     Time In: 2:08 PM  Time Out: 3:02 PM  Total Billable Time: 54 minutes  All billed as there-ex per medicaid guidelines    Precautions: Standard    DOS: 5/28/2021     PROCEDURE:              Open reduction internal fixation left trimalleolar ankle fracture with fixation of posterior lip  Open reduction internal fixation left ankle syndesmosis     PHYSICAL THERAPY: 11/22/21  -PT/OT - continue therapy with [facility] as ordered.  Weight bear as tolerated.   - ROM as tolerated    Subjective     Pt reports: He's feeling better. Noticed he has been able to walk a little faster   He was compliant with home exercise program.  Response to previous treatment: Soreness the next day,   Functional change: Improved walking tolerance/pace    Pain: 0/10   Location: left medial ankle     Objective     Ankle Active Range of Motion:   Ankle Right Left   DF 15 5   Plantarflexion 50 35   Inversion 35 25   Eversion 25 20       Naresh received therapeutic exercises to develop strength, endurance and ROM for 50 minutes including:  Upright Bike: 10 minutes, Level 5.0  Self-DF on 16" box: 20x, 10" holds  DL 1" incline calf " "raise 4x8: 2 up 1 down  SL shuttle squat: 3x10 62.5#  SL shuttle calf raise 12.5# 3x10  Modified SLS: paloff press with arch doming  Lateral step down 4x10 6" box  Modified SL squat to 24" box 3x8  Monster walks YTB at midfoot 2 laps    Not performed  Seated Heel Raises 25#KB: 30x:       Naresh received the following manual therapy techniques: Joint mobilizations were applied to the L ankle/foot for 10 minutes including:   Posterior TC glides  Medial and Lateral Calcaneal glides  Ankle PROM      Home Exercises Provided and Patient Education Provided     Education provided:   - issued hep    Written Home Exercises Provided: yes.  Exercises were reviewed and Naresh was able to demonstrate them prior to the end of the session.  Naresh demonstrated good  understanding of the education provided.     See EMR under Patient Instructions for exercises provided 9/10/2021.    Assessment   Patient with complete resolution of anterior/lateral ankle pinching following manual therapy and self DF mobilization to improve TC joint mobility. Progressed single leg stability activities as noted above with no complaints of pain throughout treatment session. Continues to fatigue quickly with SL calf strengthening as expected. Improved single leg stability during todays session Continue to progress as tolerated incorporating progressive strengthening and single leg stability activities as noted above      Naresh is progressing well towards his goals.   Pt prognosis is Good.     Pt will continue to benefit from skilled outpatient physical therapy to address the deficits listed in the problem list box on initial evaluation, provide pt/family education and to maximize pt's level of independence in the home and community environment.     Pt's spiritual, cultural and educational needs considered and pt agreeable to plan of care and goals.     Anticipated barriers to physical therapy: none    Goals:   Short Term Goals (6 Weeks):   1. Pt will be " independent with HEP to supplement PT in improving functional mobility. MET  2. Pt will improve L DF ROM with knee extended to greater than or equal to neutral to improve gait mechanics. Progressing, Not Met  3. Pt will improve L ankle manual muscle tests to greater than or equal to 3/5 to improve improve gait mechanics. Progressing, Not Met  4. Pt will walk greater than or equal to 100 feet, with good gait mechanics in order to improve ability to ambulate within the home with assistive device. Progressing, Not Met     Mid-Term Goals (12 Weeks):   1. Pt will be independent with HEP to supplement PT in improving functional mobility. Progressing, Not Met  2. Pt will improve L DF ROM with knee extended to greater than or equal to R DF ROM to improve gait mechanics. Progressing, Not Met  3. Pt will improve SLS on L foot to greater than or equal to 30 seconds to improve strength of ankle intrinsics. Progressing, Not Met  4. Pt will improve L ankle manual muscle tests to greater than or equal to 43+/5 to improve gait mechanics. Progressing, Not Met  5. Pt will walk greater than or equal to 1000 feet, pain free, in order to improve ability to ambulate within the community. Progressing, Not Met     Long Term Goals (16 Weeks):   1. Pt will be independent with updated HEP to supplement PT in improving functional mobility. Progressing, Not Met  2. Pt will improve FOTO score to </= 53% limited to decrease perceived limitation with mobility. Progressing, Not Met  3. Pt will improve impaired LE strength to >/= 4+/5 to improve strength for functional tasks. Progressing, Not Met  4. 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     Plan of care Certification: extend to 3/10/22     Outpatient Physical Therapy 2 times weekly for 10 weeks to include the  following interventions: Electrical Stimulation , FDN prn, Gait Training, Manual Therapy, Moist Heat/ Ice, Neuromuscular Re-ed, Patient Education, Therapeutic Activites, Therapeutic Exercise, Ultrasound and Kinesiotape prn.     DARIUS STEIN, PT

## 2022-03-03 ENCOUNTER — CLINICAL SUPPORT (OUTPATIENT)
Dept: REHABILITATION | Facility: HOSPITAL | Age: 52
End: 2022-03-03
Payer: MEDICAID

## 2022-03-03 DIAGNOSIS — M25.672 DECREASED RANGE OF MOTION OF LEFT ANKLE: Primary | ICD-10-CM

## 2022-03-03 DIAGNOSIS — Z74.09 IMPAIRED FUNCTIONAL MOBILITY, BALANCE, GAIT, AND ENDURANCE: ICD-10-CM

## 2022-03-03 PROCEDURE — 97110 THERAPEUTIC EXERCISES: CPT

## 2022-03-03 NOTE — PROGRESS NOTES
"  Physical Therapy Daily Treatment Note     Name: Naresh Lancaster  Clinic Number: [id number]    Therapy Diagnosis:   Encounter Diagnoses   Name Primary?    Decreased range of motion of left ankle Yes    Impaired functional mobility, balance, gait, and endurance      Physician: Dayday Correia NP    Visit Date: 3/3/2022    Physician: Dayday Correia NP     Physician Orders: PT Eval and Treat: Weight bear as tolerated in cam boot x 3 weeks then transition into a lace up ankle brace as tolerates.  ROM as tolerates.  Medical Diagnosis from Referral: S82.852D (ICD-10-CM) - Closed displaced trimalleolar fracture of left ankle with routine healing, subsequent encounter  Evaluation Date: 8/25/2021  Authorization Period Expiration: 8/20/2022  Plan of Care Expiration: 8/25/2021 to 3/10/22  Visit # / Visits authorized: 7/30     Time In: 3:00 PM  Time Out: 3:58 PM  Total Billable Time: 58 minutes  All billed as there-ex per medicaid guidelines    Precautions: Standard    DOS: 5/28/2021       Subjective     Pt reports: Been doing exercises at home. Ankle feels a little stiff but otherwise has noticed he can walk faster and isnt having much pain. Notice I have trouble when walking on a slanted surface   He was compliant with home exercise program.  Response to previous treatment: Soreness the next day,   Functional change: Improved walking tolerance/pace    Pain: 0/10   Location: left medial ankle     Objective     Ankle Active Range of Motion:   Ankle Right Left   DF 25 15     Naresh received therapeutic exercises to develop strength, endurance and ROM for 58 minutes including:  Objective measures:  Upright Bike: 10 minutes, Level 5.0  Self-DF on 16" box: 20x, 10" holds  Gastroc/soleus stretch 30s x3 each  DL 2" incline calf raise 3x10: 2 up 1 down  Seated Heel Raises 25#KB: 4x15  Lateral step down 4x10 6" box  SLS + blue airex 30s x3      Not performed:   Sled push 3 laps (next)  SL shuttle squat: 3x10 62.5#  SL shuttle " "calf raise 12.5# 3x10  Modified SLS: paloff press with arch doming  Modified SL squat to 24" box 3x8  Monster walks YTB at midfoot 2 laps        Naresh received the following manual therapy techniques: Joint mobilizations were applied to the L ankle/foot for 0 minutes including:   Posterior TC glides  Medial and Lateral Calcaneal glides  Ankle PROM      Home Exercises Provided and Patient Education Provided     Education provided:   - issued hep, encouraged to begin to return to gym    Written Home Exercises Provided: yes.  Exercises were reviewed and Naresh was able to demonstrate them prior to the end of the session.  Naresh demonstrated good  understanding of the education provided.     See EMR under Patient Instructions for exercises provided 9/10/2021.    Assessment   Re-assessment performed today with continued slight limitation into DF 2/2 decreased posterior talocrural joint mobility and gastroc/soleus tightness. Patient completed all acivities as noted above with slight complaints of anterior ankle impingement that improved following self DF mob. Progressed functional strengthening and NM re-education into increased ROM with no complaints. Significant improvement shown with ability to complete calf raise throughout full ROM. Continue to progress as tolerated incorporating unstable surfaces       Naresh is progressing well towards his goals.   Pt prognosis is Good.     Pt will continue to benefit from skilled outpatient physical therapy to address the deficits listed in the problem list box on initial evaluation, provide pt/family education and to maximize pt's level of independence in the home and community environment.     Pt's spiritual, cultural and educational needs considered and pt agreeable to plan of care and goals.     Anticipated barriers to physical therapy: none    Goals:   Short Term Goals (6 Weeks):   1. Pt will be independent with HEP to supplement PT in improving functional mobility. MET  2. " Pt will improve L DF ROM with knee extended to greater than or equal to neutral to improve gait mechanics. MET  3. Pt will improve L ankle manual muscle tests to greater than or equal to 3/5 to improve improve gait mechanics. MET  4. Pt will walk greater than or equal to 100 feet, with good gait mechanics in order to improve ability to ambulate within the home with assistive device. MET     Mid-Term Goals (12 Weeks):   1. Pt will be independent with HEP to supplement PT in improving functional mobility. MET  2. Pt will improve L DF ROM with knee extended to greater than or equal to R DF ROM to improve gait mechanics. MET  3. Pt will improve SLS on L foot to greater than or equal to 30 seconds to improve strength of ankle intrinsics. MET  4. Pt will improve L ankle manual muscle tests to greater than or equal to 3+/5 to improve gait mechanics. MET  5. Pt will walk greater than or equal to 1000 feet, pain free, in order to improve ability to ambulate within the community. MET     Long Term Goals (16 Weeks):   1. Pt will be independent with updated HEP to supplement PT in improving functional mobility. Progressing, Not Met  2. Pt will improve FOTO score to </= 53% limited to decrease perceived limitation with mobility. Progressing, Not Met  3. Pt will improve impaired LE strength to >/= 4+/5 to improve strength for functional tasks. Progressing, Not Met  4. 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     Plan of care Certification: extend to 3/10/22     Outpatient Physical Therapy 2 times weekly for 10 weeks to include the following interventions: Electrical Stimulation , FDN prn, Gait Training, Manual Therapy, Moist Heat/ Ice, Neuromuscular Re-ed, Patient Education, Therapeutic Activites, Therapeutic Exercise, Ultrasound and  Kinesiotape prn.     DARIUS STEIN, PT

## 2022-03-07 ENCOUNTER — CLINICAL SUPPORT (OUTPATIENT)
Dept: REHABILITATION | Facility: HOSPITAL | Age: 52
End: 2022-03-07
Payer: MEDICAID

## 2022-03-07 DIAGNOSIS — M25.672 DECREASED RANGE OF MOTION OF LEFT ANKLE: Primary | ICD-10-CM

## 2022-03-07 DIAGNOSIS — Z74.09 IMPAIRED FUNCTIONAL MOBILITY, BALANCE, GAIT, AND ENDURANCE: ICD-10-CM

## 2022-03-07 PROCEDURE — 97110 THERAPEUTIC EXERCISES: CPT

## 2022-03-07 NOTE — PROGRESS NOTES
"  Physical Therapy Daily Treatment Note     Name: Naresh Lancaster  Clinic Number: [id number]    Therapy Diagnosis:   Encounter Diagnoses   Name Primary?    Decreased range of motion of left ankle Yes    Impaired functional mobility, balance, gait, and endurance      Physician: Dayday Correia NP    Visit Date: 3/7/2022    Physician: Dayday Correia NP     Physician Orders: PT Eval and Treat: Weight bear as tolerated in cam boot x 3 weeks then transition into a lace up ankle brace as tolerates.  ROM as tolerates.  Medical Diagnosis from Referral: S82.852D (ICD-10-CM) - Closed displaced trimalleolar fracture of left ankle with routine healing, subsequent encounter  Evaluation Date: 8/25/2021  Authorization Period Expiration: 8/20/2022  Plan of Care Expiration: 8/25/2021 to 3/10/22  Visit # / Visits authorized: 8/30     Time In: 3:10 PM  Time Out: 4:00 PM  Total Billable Time: 50 minutes  All billed as there-ex per medicaid guidelines    Precautions: Standard    DOS: 5/28/2021       Subjective     Pt reports: Whatever we did last session definitely worked. Felt like I had more mobility   He was compliant with home exercise program.  Response to previous treatment: Soreness the next day,   Functional change: Improved walking tolerance/pace    Pain: 0/10   Location: left medial ankle     Objective     Ankle Active Range of Motion:   Ankle Right Left   DF 25 15     Naresh received therapeutic exercises to develop strength, endurance and ROM for 58 minutes including:  Upright Bike: 10 minutes, Level 5.0: np today  Self-DF on 16" box: 20x, 10" holds  Gastroc/soleus stretch 30s x3 each  DL 2" incline calf raise 4x10: 2 up 1 down  Seated Heel Raises 25#KB: 4x15  Lateral step down 4x10 6" box  SLS + blue airex 30s x3    Sled push 3 laps (next)    Not performed:     SL shuttle squat: 3x10 62.5#  SL shuttle calf raise 12.5# 3x10  Modified SLS: paloff press with arch doming  Modified SL squat to 24" box 3x8  Monster walks " YTB at [facility] 2 laps        Naresh received the following manual therapy techniques: Joint mobilizations were applied to the L ankle/foot for 0 minutes including:   Posterior TC glides  Medial and Lateral Calcaneal glides  Ankle PROM      Home Exercises Provided and Patient Education Provided     Education provided:   - issued hep, encouraged to begin to return to gym    Written Home Exercises Provided: yes.  Exercises were reviewed and Naresh was able to demonstrate them prior to the end of the session.  Naresh demonstrated good  understanding of the education provided.     See EMR under Patient Instructions for exercises provided 9/10/2021.    Assessment   Continued focus towards improving flexibility and joint mobility as well as calf strength with continued fatigue. Improvement noted with fnctional strengthening. Continue to progress as tolerated.    Naresh is progressing well towards his goals.   Pt prognosis is Good.     Pt will continue to benefit from skilled outpatient physical therapy to address the deficits listed in the problem list box on initial evaluation, provide pt/family education and to maximize pt's level of independence in the home and community environment.     Pt's spiritual, cultural and educational needs considered and pt agreeable to plan of care and goals.     Anticipated barriers to physical therapy: none    Goals:   Short Term Goals (6 Weeks):   1. Pt will be independent with HEP to supplement PT in improving functional mobility. MET  2. Pt will improve L DF ROM with knee extended to greater than or equal to neutral to improve gait mechanics. MET  3. Pt will improve L ankle manual muscle tests to greater than or equal to 3/5 to improve improve gait mechanics. MET  4. Pt will walk greater than or equal to 100 feet, with good gait mechanics in order to improve ability to ambulate within the home with assistive device. MET     Mid-Term Goals (12 Weeks):   1. Pt will be independent with HEP  to supplement PT in improving functional mobility. MET  2. Pt will improve L DF ROM with knee extended to greater than or equal to R DF ROM to improve gait mechanics. MET  3. Pt will improve SLS on L foot to greater than or equal to 30 seconds to improve strength of ankle intrinsics. MET  4. Pt will improve L ankle manual muscle tests to greater than or equal to 3+/5 to improve gait mechanics. MET  5. Pt will walk greater than or equal to 1000 feet, pain free, in order to improve ability to ambulate within the community. MET     Long Term Goals (16 Weeks):   1. Pt will be independent with updated HEP to supplement PT in improving functional mobility. Progressing, Not Met  2. Pt will improve FOTO score to </= 53% limited to decrease perceived limitation with mobility. Progressing, Not Met  3. Pt will improve impaired LE strength to >/= 4+/5 to improve strength for functional tasks. Progressing, Not Met  4. 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     Plan of care Certification: extend to 3/10/22     Outpatient Physical Therapy 2 times weekly for 10 weeks to include the following interventions: Electrical Stimulation , FDN prn, Gait Training, Manual Therapy, Moist Heat/ Ice, Neuromuscular Re-ed, Patient Education, Therapeutic Activites, Therapeutic Exercise, Ultrasound and Kinesiotape prn.     DARIUS STEIN, PT

## 2022-03-28 ENCOUNTER — DOCUMENTATION ONLY (OUTPATIENT)
Dept: REHABILITATION | Facility: HOSPITAL | Age: 52
End: 2022-03-28
Payer: MEDICAID

## 2022-03-28 NOTE — PROGRESS NOTES
Pt called regarding missed appointment on 2/28/22. Pt educated on cancel/no show policy and was notified due to history of no show/cancelations pt will only be scheduled out 1 week in advance to limit cancel/no show rate.    Rojelio Antonio, PT, DPT

## 2022-04-04 ENCOUNTER — TELEPHONE (OUTPATIENT)
Dept: OTOLARYNGOLOGY | Facility: CLINIC | Age: 52
End: 2022-04-04
Payer: MEDICAID

## 2022-04-04 NOTE — TELEPHONE ENCOUNTER
Returned pt call. Scheduled pt an appointment per pt's request.     ----- Message from Caroline Tripp sent at 4/4/2022  9:39 AM CDT -----  Contact: [phone]  No blue slot available to schedule an appointment for the patient.  Patient is established with which PCP:   Reason for the visit: r/s patient would like to be seen to discuss surgery/ see notes from 8/20/21   Would the patient like a call back, or a response through their [facility] portal?:  call back

## 2022-05-02 ENCOUNTER — OFFICE VISIT (OUTPATIENT)
Dept: OTOLARYNGOLOGY | Facility: CLINIC | Age: 52
End: 2022-05-02
Payer: MEDICAID

## 2022-05-02 VITALS
SYSTOLIC BLOOD PRESSURE: 126 MMHG | TEMPERATURE: 98 F | DIASTOLIC BLOOD PRESSURE: 87 MMHG | BODY MASS INDEX: 35.87 KG/M2 | HEART RATE: 91 BPM | WEIGHT: 257.19 LBS

## 2022-05-02 DIAGNOSIS — K13.79 HYPERTROPHIC SOFT PALATE: ICD-10-CM

## 2022-05-02 DIAGNOSIS — J34.2 NASAL SEPTAL DEVIATION: ICD-10-CM

## 2022-05-02 DIAGNOSIS — J35.1 HYPERTROPHY OF TONSIL: ICD-10-CM

## 2022-05-02 DIAGNOSIS — K13.79 HYPERTROPHY OF UVULA: ICD-10-CM

## 2022-05-02 DIAGNOSIS — Z78.9 INTOLERANCE OF CONTINUOUS POSITIVE AIRWAY PRESSURE (CPAP) VENTILATION: ICD-10-CM

## 2022-05-02 DIAGNOSIS — G47.33 OSA (OBSTRUCTIVE SLEEP APNEA): Primary | ICD-10-CM

## 2022-05-02 DIAGNOSIS — K14.8 ACQUIRED MACROGLOSSIA: ICD-10-CM

## 2022-05-02 PROCEDURE — 3079F PR MOST RECENT DIASTOLIC BLOOD PRESSURE 80-89 MM HG: ICD-10-PCS | Mod: CPTII,,, | Performed by: SPECIALIST

## 2022-05-02 PROCEDURE — 4010F PR ACE/ARB THEARPY RXD/TAKEN: ICD-10-PCS | Mod: CPTII,,, | Performed by: SPECIALIST

## 2022-05-02 PROCEDURE — 99213 OFFICE O/P EST LOW 20 MIN: CPT | Mod: PBBFAC,PN | Performed by: SPECIALIST

## 2022-05-02 PROCEDURE — 99213 OFFICE O/P EST LOW 20 MIN: CPT | Mod: S$PBB,,, | Performed by: SPECIALIST

## 2022-05-02 PROCEDURE — 3008F PR BODY MASS INDEX (BMI) DOCUMENTED: ICD-10-PCS | Mod: CPTII,,, | Performed by: SPECIALIST

## 2022-05-02 PROCEDURE — 1160F PR REVIEW ALL MEDS BY PRESCRIBER/CLIN PHARMACIST DOCUMENTED: ICD-10-PCS | Mod: CPTII,,, | Performed by: SPECIALIST

## 2022-05-02 PROCEDURE — 3074F SYST BP LT 130 MM HG: CPT | Mod: CPTII,,, | Performed by: SPECIALIST

## 2022-05-02 PROCEDURE — 99999 PR PBB SHADOW E&M-EST. PATIENT-LVL III: CPT | Mod: PBBFAC,,, | Performed by: SPECIALIST

## 2022-05-02 PROCEDURE — 99999 PR PBB SHADOW E&M-EST. PATIENT-LVL III: ICD-10-PCS | Mod: PBBFAC,,, | Performed by: SPECIALIST

## 2022-05-02 PROCEDURE — 1159F PR MEDICATION LIST DOCUMENTED IN MEDICAL RECORD: ICD-10-PCS | Mod: CPTII,,, | Performed by: SPECIALIST

## 2022-05-02 PROCEDURE — 4010F ACE/ARB THERAPY RXD/TAKEN: CPT | Mod: CPTII,,, | Performed by: SPECIALIST

## 2022-05-02 PROCEDURE — 1159F MED LIST DOCD IN RCRD: CPT | Mod: CPTII,,, | Performed by: SPECIALIST

## 2022-05-02 PROCEDURE — 3074F PR MOST RECENT SYSTOLIC BLOOD PRESSURE < 130 MM HG: ICD-10-PCS | Mod: CPTII,,, | Performed by: SPECIALIST

## 2022-05-02 PROCEDURE — 99213 PR OFFICE/OUTPT VISIT, EST, LEVL III, 20-29 MIN: ICD-10-PCS | Mod: S$PBB,,, | Performed by: SPECIALIST

## 2022-05-02 PROCEDURE — 3008F BODY MASS INDEX DOCD: CPT | Mod: CPTII,,, | Performed by: SPECIALIST

## 2022-05-02 PROCEDURE — 1160F RVW MEDS BY RX/DR IN RCRD: CPT | Mod: CPTII,,, | Performed by: SPECIALIST

## 2022-05-02 PROCEDURE — 3079F DIAST BP 80-89 MM HG: CPT | Mod: CPTII,,, | Performed by: SPECIALIST

## 2022-05-02 RX ORDER — PHENTERMINE HYDROCHLORIDE 37.5 MG/1
37.5 TABLET ORAL DAILY
COMMUNITY
Start: 2022-04-28 | End: 2022-06-15 | Stop reason: CLARIF

## 2022-05-02 NOTE — PROGRESS NOTES
Subjective:       Patient ID: Naresh Lancaster is a 51 y.o. male.    Chief Complaint: Snoring and breathing    I have previously seen this patient.  After last visit he was going to proceed with a UPPP; however, he developed COVID and surgery did not occur.  He does have very loud nighttime snoring.  He will awaken himself gasping for air when he is laying on his back.  He did have a sleep study done at [facility] but does not remember the results.  Results were requested at last visit but have not been received.  He would like to proceed with surgery for his sleep apnea.  He is intolerant of CPAP and will not use it.        Review of Systems     Constitutional: Positive for fatigue.  Negative for fever and unexpected weight loss.      HENT: Positive for postnasal drip, runny nose, sore throat, stuffy nose and trouble swallowing.  Negative for sinus infection, sinus pressure and tonsil infection.      Respiratory:  Positive for sleep apnea and snoring. Negative for cough, shortness of breath and wheezing.      Cardiovascular:  Negative for chest pain, foot swelling, irregular heartbeat and swollen veins.     Gastrointestinal:  Positive for acid reflux and heartburn. Negative for abdominal pain, constipation, diarrhea and vomiting.     Genitourinary: Negative for difficulty urinating, sexual problems and frequent urination.     Musc: Positive for aching joints.               Objective:      Physical Exam  Vitals and nursing note reviewed. Exam conducted with a chaperone present (The patient's son accompanies him to the visit).   Constitutional:       General: He is awake.      Appearance: Normal appearance. He is well-developed, well-groomed and overweight.   HENT:      Head: Normocephalic.      Jaw: There is normal jaw occlusion.      Right Ear: Hearing, tympanic membrane, ear canal and external ear normal.      Left Ear: Hearing, tympanic membrane, ear canal and external ear normal.      Nose: Septal  deviation (Anterior septal deflection to the right, midline septum more posteriorly ) and rhinorrhea present. No nasal deformity or mucosal edema. Rhinorrhea is clear.      Right Turbinates: Enlarged and pale.      Left Turbinates: Enlarged and pale.      Mouth/Throat:      Lips: Pink. No lesions.      Mouth: Mucous membranes are moist. No oral lesions.      Dentition: No gum lesions.      Tongue: No lesions. Tongue does not deviate from midline.      Palate: Lesions ( long thin soft palate) present. No mass.      Pharynx: Oropharynx is clear.      Tonsils: No tonsillar exudate. 3+ on the right. 3+ on the left.      Comments: Oral cavity-Orozco class 2, tonsils 3+/4+ enlarged, long thin soft palate, thick uvula that hangs beneath base of tongue, base of tongue enlarged with narrowing of the oropharyngeal inlet  Neurological:      Mental Status: He is alert.   Psychiatric:         Behavior: Behavior is cooperative.         Assessment:       1. VAN (obstructive sleep apnea)    2. Intolerance of continuous positive airway pressure (CPAP) ventilation    3. Nasal septal deviation    4. Acquired macroglossia    5. Hypertrophic soft palate    6. Hypertrophy of tonsil    7. Hypertrophy of uvula        Plan:       I will have the patient sign and record release for results of his sleep study.  If I cannot obtain results of that sleep study I will schedule patient for a home sleep study.  Final decision regarding surgery will be made pending results of the sleep study.

## 2022-05-06 ENCOUNTER — TELEPHONE (OUTPATIENT)
Dept: OTOLARYNGOLOGY | Facility: CLINIC | Age: 52
End: 2022-05-06
Payer: MEDICAID

## 2022-05-06 NOTE — TELEPHONE ENCOUNTER
Returned pt call. Scheduled pt an appointment per provider's request.     ----- Message from Ariadne Swenson sent at 5/6/2022 11:05 AM CDT -----  Contact: Patient  Patient requesting call back in regards to speaking with someone about an upcoming procedure.      Patient @[phone] (home)

## 2022-05-10 ENCOUNTER — OFFICE VISIT (OUTPATIENT)
Dept: OTOLARYNGOLOGY | Facility: CLINIC | Age: 52
End: 2022-05-10
Payer: MEDICAID

## 2022-05-10 VITALS
SYSTOLIC BLOOD PRESSURE: 137 MMHG | TEMPERATURE: 98 F | WEIGHT: 254.44 LBS | DIASTOLIC BLOOD PRESSURE: 94 MMHG | BODY MASS INDEX: 35.48 KG/M2 | HEART RATE: 96 BPM

## 2022-05-10 DIAGNOSIS — K13.79 HYPERTROPHY OF UVULA: ICD-10-CM

## 2022-05-10 DIAGNOSIS — G47.33 OSA (OBSTRUCTIVE SLEEP APNEA): Primary | ICD-10-CM

## 2022-05-10 DIAGNOSIS — K14.8 ACQUIRED MACROGLOSSIA: ICD-10-CM

## 2022-05-10 DIAGNOSIS — J34.2 NASAL SEPTAL DEVIATION: ICD-10-CM

## 2022-05-10 DIAGNOSIS — Z78.9 INTOLERANCE OF CONTINUOUS POSITIVE AIRWAY PRESSURE (CPAP) VENTILATION: ICD-10-CM

## 2022-05-10 DIAGNOSIS — K13.79 HYPERTROPHIC SOFT PALATE: ICD-10-CM

## 2022-05-10 DIAGNOSIS — J35.1 HYPERTROPHY OF TONSIL: ICD-10-CM

## 2022-05-10 DIAGNOSIS — J30.9 ALLERGIC RHINITIS, UNSPECIFIED SEASONALITY, UNSPECIFIED TRIGGER: ICD-10-CM

## 2022-05-10 PROCEDURE — 99213 OFFICE O/P EST LOW 20 MIN: CPT | Mod: PBBFAC,PN | Performed by: SPECIALIST

## 2022-05-10 PROCEDURE — 1160F PR REVIEW ALL MEDS BY PRESCRIBER/CLIN PHARMACIST DOCUMENTED: ICD-10-PCS | Mod: CPTII,,, | Performed by: SPECIALIST

## 2022-05-10 PROCEDURE — 1159F PR MEDICATION LIST DOCUMENTED IN MEDICAL RECORD: ICD-10-PCS | Mod: CPTII,,, | Performed by: SPECIALIST

## 2022-05-10 PROCEDURE — 3075F PR MOST RECENT SYSTOLIC BLOOD PRESS GE 130-139MM HG: ICD-10-PCS | Mod: CPTII,,, | Performed by: SPECIALIST

## 2022-05-10 PROCEDURE — 4010F PR ACE/ARB THEARPY RXD/TAKEN: ICD-10-PCS | Mod: CPTII,,, | Performed by: SPECIALIST

## 2022-05-10 PROCEDURE — 3075F SYST BP GE 130 - 139MM HG: CPT | Mod: CPTII,,, | Performed by: SPECIALIST

## 2022-05-10 PROCEDURE — 3080F PR MOST RECENT DIASTOLIC BLOOD PRESSURE >= 90 MM HG: ICD-10-PCS | Mod: CPTII,,, | Performed by: SPECIALIST

## 2022-05-10 PROCEDURE — 3008F PR BODY MASS INDEX (BMI) DOCUMENTED: ICD-10-PCS | Mod: CPTII,,, | Performed by: SPECIALIST

## 2022-05-10 PROCEDURE — 99214 PR OFFICE/OUTPT VISIT, EST, LEVL IV, 30-39 MIN: ICD-10-PCS | Mod: S$PBB,,, | Performed by: SPECIALIST

## 2022-05-10 PROCEDURE — 99999 PR PBB SHADOW E&M-EST. PATIENT-LVL III: ICD-10-PCS | Mod: PBBFAC,,, | Performed by: SPECIALIST

## 2022-05-10 PROCEDURE — 99999 PR PBB SHADOW E&M-EST. PATIENT-LVL III: CPT | Mod: PBBFAC,,, | Performed by: SPECIALIST

## 2022-05-10 PROCEDURE — 99214 OFFICE O/P EST MOD 30 MIN: CPT | Mod: S$PBB,,, | Performed by: SPECIALIST

## 2022-05-10 PROCEDURE — 1160F RVW MEDS BY RX/DR IN RCRD: CPT | Mod: CPTII,,, | Performed by: SPECIALIST

## 2022-05-10 PROCEDURE — 3008F BODY MASS INDEX DOCD: CPT | Mod: CPTII,,, | Performed by: SPECIALIST

## 2022-05-10 PROCEDURE — 3080F DIAST BP >= 90 MM HG: CPT | Mod: CPTII,,, | Performed by: SPECIALIST

## 2022-05-10 PROCEDURE — 4010F ACE/ARB THERAPY RXD/TAKEN: CPT | Mod: CPTII,,, | Performed by: SPECIALIST

## 2022-05-10 PROCEDURE — 1159F MED LIST DOCD IN RCRD: CPT | Mod: CPTII,,, | Performed by: SPECIALIST

## 2022-05-10 RX ORDER — FLUTICASONE PROPIONATE 50 MCG
2 SPRAY, SUSPENSION (ML) NASAL DAILY
Qty: 18.2 ML | Refills: 11 | Status: SHIPPED | OUTPATIENT
Start: 2022-05-10

## 2022-05-10 RX ORDER — LORATADINE 10 MG/1
10 TABLET ORAL DAILY
Qty: 30 TABLET | Refills: 11
Start: 2022-05-10 | End: 2023-05-10

## 2022-05-10 NOTE — PROGRESS NOTES
Subjective:       Patient ID: Naresh Lancaster is a 51 y.o. male.    Chief Complaint: Follow-up    HPI    The patient is coming in for follow-up visit.  He did bring results of his sleep study which demonstrated mild sleep apnea.  He is having nasal congestion.  His wife has noted that he is snoring both while on his back, which seem to cause his tongue fall to the back of his throat or when he is on his side, in which case the tongue base is not involved.  He has been having a lot of nasal congestion and stuffiness and postnasal drip.  Nasal secretions are either clear or white.      Review of Systems     Constitutional: Positive for fatigue.  Negative for fever and unexpected weight loss.      HENT: Positive for postnasal drip, runny nose, sore throat, stuffy nose and trouble swallowing.  Negative for sinus infection, sinus pressure and tonsil infection.      Respiratory:  Positive for sleep apnea and snoring. Negative for cough, shortness of breath and wheezing.      Cardiovascular:  Negative for chest pain, foot swelling, irregular heartbeat and swollen veins.     Gastrointestinal:  Positive for acid reflux and heartburn. Negative for abdominal pain, constipation, diarrhea and vomiting.     Genitourinary: Negative for difficulty urinating, sexual problems and frequent urination.     Musc: Positive for aching joints.               Objective:      Physical Exam  Vitals and nursing note reviewed. Exam conducted with a chaperone present (The patient's son accompanies him to the visit).   Constitutional:       General: He is awake.      Appearance: Normal appearance. He is well-developed, well-groomed and overweight.   HENT:      Head: Normocephalic.      Jaw: There is normal jaw occlusion.      Right Ear: Hearing, tympanic membrane, ear canal and external ear normal.      Left Ear: Hearing, tympanic membrane, ear canal and external ear normal.      Nose: Septal deviation (Anterior septal deflection to the right,  midline septum more posteriorly ) and rhinorrhea present. No nasal deformity or mucosal edema. Rhinorrhea is clear.      Right Turbinates: Enlarged and pale.      Left Turbinates: Enlarged and pale.      Mouth/Throat:      Lips: Pink. No lesions.      Mouth: Mucous membranes are moist. No oral lesions.      Dentition: No gum lesions.      Tongue: No lesions. Tongue does not deviate from midline.      Palate: Lesions ( long thin soft palate) present. No mass.      Pharynx: Oropharynx is clear.      Tonsils: No tonsillar exudate. 3+ on the right. 3+ on the left.      Comments: Oral cavity-Orozco class 2, tonsils 3+/4+ enlarged, long thin soft palate, thick uvula that hangs beneath base of tongue, base of tongue enlarged with narrowing of the oropharyngeal inlet  Neurological:      Mental Status: He is alert.   Psychiatric:         Behavior: Behavior is cooperative.         Review of patient's sleep study:  Patient has mild sleep apnea, report not available in epic yet    Assessment:       1. VAN (obstructive sleep apnea)    2. Intolerance of continuous positive airway pressure (CPAP) ventilation    3. Acquired macroglossia    4. Hypertrophic soft palate    5. Hypertrophy of tonsil    6. Hypertrophy of uvula    7. Allergic rhinitis, unspecified seasonality, unspecified trigger    8. Nasal septal deviation        Plan:       I am starting the patient on a combination of fluticasone nasal spray and Claritin daily.  I will schedule him for a uvulopalatopharyngoplasty and a coblation of the base of tongue.  We did discuss risks and benefits of the procedure as outlined on the informed consent.  I answered all his questions.  Operative consent was signed and witnessed.  I am having him get a preoperative clearance from his primary care physician I will recheck him 1 week postoperatively.

## 2022-05-12 DIAGNOSIS — J34.3 NASAL TURBINATE HYPERTROPHY: ICD-10-CM

## 2022-05-12 DIAGNOSIS — K13.79 HYPERTROPHY OF UVULA: ICD-10-CM

## 2022-05-12 DIAGNOSIS — K14.8 ACQUIRED MACROGLOSSIA: ICD-10-CM

## 2022-05-12 DIAGNOSIS — S02.2XXK CLOSED FRACTURE OF NASAL BONE WITH NONUNION, SUBSEQUENT ENCOUNTER: ICD-10-CM

## 2022-05-12 DIAGNOSIS — G47.33 OSA (OBSTRUCTIVE SLEEP APNEA): Primary | ICD-10-CM

## 2022-05-12 DIAGNOSIS — K13.79 HYPERTROPHIC SOFT PALATE: ICD-10-CM

## 2022-06-15 ENCOUNTER — TELEPHONE (OUTPATIENT)
Dept: OTOLARYNGOLOGY | Facility: CLINIC | Age: 52
End: 2022-06-15
Payer: MEDICAID

## 2022-06-15 ENCOUNTER — HOSPITAL ENCOUNTER (OUTPATIENT)
Dept: PREADMISSION TESTING | Facility: OTHER | Age: 52
Discharge: HOME | End: 2022-06-15
Attending: SPECIALIST
Payer: MEDICAID

## 2022-06-15 ENCOUNTER — ANESTHESIA EVENT (OUTPATIENT)
Dept: SURGERY | Facility: OTHER | Age: 52
End: 2022-06-15
Payer: MEDICAID

## 2022-06-15 VITALS
DIASTOLIC BLOOD PRESSURE: 94 MMHG | HEART RATE: 90 BPM | OXYGEN SATURATION: 97 % | BODY MASS INDEX: 34.87 KG/M2 | WEIGHT: 250 LBS | SYSTOLIC BLOOD PRESSURE: 139 MMHG | TEMPERATURE: 99 F

## 2022-06-15 RX ORDER — SODIUM CHLORIDE, SODIUM LACTATE, POTASSIUM CHLORIDE, CALCIUM CHLORIDE 600; 310; 30; 20 MG/100ML; MG/100ML; MG/100ML; MG/100ML
INJECTION, SOLUTION INTRAVENOUS CONTINUOUS
Status: CANCELLED | OUTPATIENT
Start: 2022-06-15

## 2022-06-15 RX ORDER — ACETAMINOPHEN 500 MG
1000 TABLET ORAL
Status: CANCELLED | OUTPATIENT
Start: 2022-06-15 | End: 2022-06-15

## 2022-06-15 NOTE — TELEPHONE ENCOUNTER
Called pt to inform him that his report time for surgery on Monday 6/20 is 830 am at [address]

## 2022-06-15 NOTE — DISCHARGE INSTRUCTIONS
Information to Prepare you for your Surgery    PRE-ADMIT TESTING -  [phone]    [address] [facility]          Your surgery has been scheduled at [facility]. We are pleased to have the opportunity to serve you. For Further Information please call [phone].    On the day of surgery please report to the Information Desk on the 1st floor.    CONTACT YOUR PHYSICIAN'S OFFICE THE DAY PRIOR TO YOUR SURGERY TO OBTAIN YOUR ARRIVAL TIME.     The evening before surgery do not eat anything after 9 p.m. ( this includes hard candy, chewing gum and mints).  You may only have GATORADE, POWERADE AND WATER  from 9 p.m. until you leave your home.   DO NOT DRINK ANY LIQUIDS ON THE WAY TO THE HOSPITAL.      Why does your anesthesiologist allow you to drink Gatorade/Powerade before surgery?  Gatorade/Powerade helps to increase your comfort before surgery and to decrease your nausea after surgery. The carbohydrates in Gatorade/Powerade help reduce your body's stress response to surgery.  If you are a diabetic-drink only water prior to surgery.      Current Visitor policy(12/27/2021) - Patients may have 2 visitors pre and post procedure. Only 2 visitors will be allowed in the Surgical building with the patient.     SPECIAL MEDICATION INSTRUCTIONS: TAKE medications checked off by the Anesthesiologist on your Medication List.    Angiogram Patients: Take medications as instructed by your physician, including aspirin.     Surgery Patients:    If you take ASPIRIN - Your PHYSICIAN/SURGEON will need to inform you IF/OR when you need to stop taking aspirin prior to your surgery.     Do Not take any medications containing IBUPROFEN.    Do Not Wear any make-up (especially eye make-up) to surgery. Please remove any false eyelashes or eyelash extensions. If you arrive the day of surgery with makeup/eyelashes on you will be required to remove prior to surgery. (There is a risk of corneal  abrasions if eye makeup/eyelash extensions are not removed)      Leave all valuables at home.   Do Not wear any jewelry or watches, including any metal in body piercings. Jewelry must be removed prior to coming to the hospital.  There is a possibility that rings that are unable to be removed may be cut off if they are on the surgical extremity.    Please remove all hair extensions, wigs, clips and any other metal accessories/ ornaments from your hair.  These items may pose a flammable/fire risk in Surgery and must be removed.    Do not shave your surgical area at least 5 days prior to your surgery. The surgical prep will be performed at the hospital according to Infection Control regulations.    Contact Lens must be removed before surgery. Either do not wear the contact lens or bring a case and solution for storage.  Please bring a container for eyeglasses or dentures as required.  Bring any paperwork your physician has provided, such as consent forms,  history and physicals, doctor's orders, etc.   Bring comfortable clothes that are loose fitting to wear upon discharge. Take into consideration the type of surgery being performed.  Maintain your diet as advised per your physician the day prior to surgery.      Adequate rest the night before surgery is advised.   Park in the Parking lot behind the hospital or in the [facility] across the street from the parking lot. Parking is complimentary.  If you will be discharged the same day as your procedure, please arrange for a responsible adult to drive you home or to accompany you if traveling by taxi.   YOU WILL NOT BE PERMITTED TO DRIVE OR TO LEAVE THE HOSPITAL ALONE AFTER SURGERY.   If you are being discharged the same day, it is strongly recommended that you arrange for someone to remain with you for the first 24 hrs following your surgery.    The Surgeon will speak to your family/visitor after your surgery regarding the outcome of your surgery and post op  care.  The Surgeon may speak to you after your surgery, but there is a possibility you may not remember the details.  Please check with your family members regarding the conversation with the Surgeon.    We strongly recommend whoever is bringing you home be present for discharge instructions.  This will ensure a thorough understanding for your post op home care.    ALL CHILDREN MUST ALWAYS BE ACCOMPANIED BY AN ADULT.    Visitors-Refer to current Visitor policy handouts.    Thank you for your cooperation.  The Staff of [facility].            Bathing Instructions with Hibiclens    Shower the evening before and morning of your procedure with Hibiclens:  Wash your face with water and your regular face wash/soap  Apply Hibiclens directly on your skin or on a wet washcloth and wash gently. When showering: Move away from the shower stream when applying Hibiclens to avoid rinsing off too soon.  Rinse thoroughly with warm water  Do not dilute Hibiclens        Dry off as usual, do not use any deodorant, powder, body lotions, perfume, after shave or cologne.

## 2022-06-17 RX ORDER — CEFAZOLIN SODIUM/D5W 2 G/100 ML
2 PLASTIC BAG, INJECTION (ML) INTRAVENOUS
Status: CANCELLED | OUTPATIENT
Start: 2022-06-17

## 2022-06-17 RX ORDER — LIDOCAINE HYDROCHLORIDE 10 MG/ML
1 INJECTION, SOLUTION EPIDURAL; INFILTRATION; INTRACAUDAL; PERINEURAL ONCE
Status: CANCELLED | OUTPATIENT
Start: 2022-06-17 | End: 2022-06-17

## 2022-06-17 RX ORDER — DEXAMETHASONE SODIUM PHOSPHATE 4 MG/ML
12 INJECTION, SOLUTION INTRA-ARTICULAR; INTRALESIONAL; INTRAMUSCULAR; INTRAVENOUS; SOFT TISSUE
Status: CANCELLED | OUTPATIENT
Start: 2022-06-17

## 2022-06-17 NOTE — H&P
Subjective:       Patient ID: Naresh Lancaster is a 51 y.o. male.     Chief Complaint: Snoring and breathing     I have previously seen this patient.  After last visit he was going to proceed with a UPPP; however, he developed COVID and surgery did not occur.  He does have very loud nighttime snoring.  He will awaken himself gasping for air when he is laying on his back.  He did have a sleep study done at [facility] but does not remember the results.  Results were requested at last visit but have not been received.  He would like to proceed with surgery for his sleep apnea.  He is intolerant of CPAP and will not use it.           Review of Systems      Constitutional: Positive for fatigue.  Negative for fever and unexpected weight loss.       HENT: Positive for postnasal drip, runny nose, sore throat, stuffy nose and trouble swallowing.  Negative for sinus infection, sinus pressure and tonsil infection.       Respiratory:  Positive for sleep apnea and snoring. Negative for cough, shortness of breath and wheezing.       Cardiovascular:  Negative for chest pain, foot swelling, irregular heartbeat and swollen veins.      Gastrointestinal:  Positive for acid reflux and heartburn. Negative for abdominal pain, constipation, diarrhea and vomiting.      Genitourinary: Negative for difficulty urinating, sexual problems and frequent urination.      Musc: Positive for aching joints.                   Objective:   Physical Exam  Vitals and nursing note reviewed. Exam conducted with a chaperone present (The patient's son accompanies him to the visit).   Constitutional:       General: He is awake.      Appearance: Normal appearance. He is well-developed, well-groomed and overweight.   HENT:      Head: Normocephalic.      Jaw: There is normal jaw occlusion.      Right Ear: Hearing, tympanic membrane, ear canal and external ear normal.      Left Ear: Hearing, tympanic membrane, ear canal and external ear normal.       Nose: Septal deviation (Anterior septal deflection to the right, midline septum more posteriorly ) and rhinorrhea present. No nasal deformity or mucosal edema. Rhinorrhea is clear.      Right Turbinates: Enlarged and pale.      Left Turbinates: Enlarged and pale.      Mouth/Throat:      Lips: Pink. No lesions.      Mouth: Mucous membranes are moist. No oral lesions.      Dentition: No gum lesions.      Tongue: No lesions. Tongue does not deviate from midline.      Palate: Lesions ( long thin soft palate) present. No mass.      Pharynx: Oropharynx is clear.      Tonsils: No tonsillar exudate. 3+ on the right. 3+ on the left.      Comments: Oral cavity-Orozco class 2, tonsils 3+/4+ enlarged, long thin soft palate, thick uvula that hangs beneath base of tongue, base of tongue enlarged with narrowing of the oropharyngeal inlet  Neurological:      Mental Status: He is alert.   Psychiatric:         Behavior: Behavior is cooperative.          Assessment:       1. VAN (obstructive sleep apnea)    2. Intolerance of continuous positive airway pressure (CPAP) ventilation    3. Nasal septal deviation    4. Acquired macroglossia    5. Hypertrophic soft palate    6. Hypertrophy of tonsil    7. Hypertrophy of uvula        Plan:     The patient is being admitted for uvulopalatopharyngoplasty and tonsillectomy.  Surgery will be performed on an outpatient basis with the an extended stay overnight.

## 2022-06-17 NOTE — PRE ADMISSION SCREENING
Labs and EKG were supposed to be sent over from Dr. Ruiz's office (Ph [phone]).  Called office and they are closed on Friday.  Will do cbc am of surgery if unable to obtain outside lab results prior to surgery.

## 2022-06-20 ENCOUNTER — ANESTHESIA (OUTPATIENT)
Dept: SURGERY | Facility: OTHER | Age: 52
End: 2022-06-20
Payer: MEDICAID

## 2022-06-20 ENCOUNTER — HOSPITAL ENCOUNTER (OUTPATIENT)
Facility: OTHER | Age: 52
Discharge: HOME | End: 2022-06-21
Attending: SPECIALIST | Admitting: SPECIALIST
Payer: MEDICAID

## 2022-06-20 DIAGNOSIS — M25.672 DECREASED RANGE OF MOTION OF LEFT ANKLE: ICD-10-CM

## 2022-06-20 DIAGNOSIS — J35.1 HYPERTROPHY OF TONSIL: ICD-10-CM

## 2022-06-20 DIAGNOSIS — Z78.9 INTOLERANCE OF CONTINUOUS POSITIVE AIRWAY PRESSURE (CPAP) VENTILATION: ICD-10-CM

## 2022-06-20 DIAGNOSIS — I10 ESSENTIAL HYPERTENSION: ICD-10-CM

## 2022-06-20 DIAGNOSIS — K14.8 ACQUIRED MACROGLOSSIA: ICD-10-CM

## 2022-06-20 DIAGNOSIS — Z01.818 PREOP TESTING: ICD-10-CM

## 2022-06-20 DIAGNOSIS — K13.79 HYPERTROPHY OF UVULA: ICD-10-CM

## 2022-06-20 DIAGNOSIS — G47.33 OSA (OBSTRUCTIVE SLEEP APNEA): Primary | ICD-10-CM

## 2022-06-20 DIAGNOSIS — K13.79 HYPERTROPHIC SOFT PALATE: ICD-10-CM

## 2022-06-20 DIAGNOSIS — Z74.09 IMPAIRED FUNCTIONAL MOBILITY, BALANCE, GAIT, AND ENDURANCE: ICD-10-CM

## 2022-06-20 DIAGNOSIS — J34.2 NASAL SEPTAL DEVIATION: ICD-10-CM

## 2022-06-20 LAB
BASOPHILS # BLD AUTO: 0.05 K/UL (ref 0–0.2)
BASOPHILS NFR BLD: 0.5 % (ref 0–1.9)
DIFFERENTIAL METHOD: ABNORMAL
EOSINOPHIL # BLD AUTO: 0.1 K/UL (ref 0–0.5)
EOSINOPHIL NFR BLD: 0.5 % (ref 0–8)
ERYTHROCYTE [DISTWIDTH] IN BLOOD BY AUTOMATED COUNT: 13.1 % (ref 11.5–14.5)
HCT VFR BLD AUTO: 48.9 % (ref 40–54)
HGB BLD-MCNC: 16.8 G/DL (ref 14–18)
IMM GRANULOCYTES # BLD AUTO: 0.13 K/UL (ref 0–0.04)
IMM GRANULOCYTES NFR BLD AUTO: 1.3 % (ref 0–0.5)
LYMPHOCYTES # BLD AUTO: 2.9 K/UL (ref 1–4.8)
LYMPHOCYTES NFR BLD: 29.8 % (ref 18–48)
MCH RBC QN AUTO: 31.1 PG (ref 27–31)
MCHC RBC AUTO-ENTMCNC: 34.4 G/DL (ref 32–36)
MCV RBC AUTO: 91 FL (ref 82–98)
MONOCYTES # BLD AUTO: 0.9 K/UL (ref 0.3–1)
MONOCYTES NFR BLD: 9.2 % (ref 4–15)
NEUTROPHILS # BLD AUTO: 5.7 K/UL (ref 1.8–7.7)
NEUTROPHILS NFR BLD: 58.7 % (ref 38–73)
NRBC BLD-RTO: 0 /100 WBC
PLATELET # BLD AUTO: 196 K/UL (ref 150–450)
PMV BLD AUTO: 10.5 FL (ref 9.2–12.9)
RBC # BLD AUTO: 5.4 M/UL (ref 4.6–6.2)
WBC # BLD AUTO: 9.78 K/UL (ref 3.9–12.7)

## 2022-06-20 PROCEDURE — 36415 COLL VENOUS BLD VENIPUNCTURE: CPT | Performed by: SPECIALIST

## 2022-06-20 PROCEDURE — 63600175 PHARM REV CODE 636 W HCPCS: Performed by: ANESTHESIOLOGY

## 2022-06-20 PROCEDURE — 36000707: Performed by: SPECIALIST

## 2022-06-20 PROCEDURE — 42145 PR PALATOPHAYNGOPLASTY: ICD-10-PCS | Mod: ,,, | Performed by: SPECIALIST

## 2022-06-20 PROCEDURE — 37000008 HC ANESTHESIA 1ST 15 MINUTES: Performed by: SPECIALIST

## 2022-06-20 PROCEDURE — 85025 COMPLETE CBC W/AUTO DIFF WBC: CPT | Performed by: SPECIALIST

## 2022-06-20 PROCEDURE — 00170 ANES INTRAORAL PX NOS: CPT | Performed by: SPECIALIST

## 2022-06-20 PROCEDURE — 36000706: Performed by: SPECIALIST

## 2022-06-20 PROCEDURE — 63600175 PHARM REV CODE 636 W HCPCS: Performed by: SPECIALIST

## 2022-06-20 PROCEDURE — 88304 TISSUE EXAM BY PATHOLOGIST: CPT | Performed by: PATHOLOGY

## 2022-06-20 PROCEDURE — 88304 TISSUE EXAM BY PATHOLOGIST: CPT | Mod: 26,,, | Performed by: PATHOLOGY

## 2022-06-20 PROCEDURE — 37000009 HC ANESTHESIA EA ADD 15 MINS: Performed by: SPECIALIST

## 2022-06-20 PROCEDURE — 63600175 PHARM REV CODE 636 W HCPCS: Performed by: NURSE ANESTHETIST, CERTIFIED REGISTERED

## 2022-06-20 PROCEDURE — 25000003 PHARM REV CODE 250: Performed by: NURSE ANESTHETIST, CERTIFIED REGISTERED

## 2022-06-20 PROCEDURE — 42145 REPAIR PALATE PHARYNX/UVULA: CPT | Mod: ,,, | Performed by: SPECIALIST

## 2022-06-20 PROCEDURE — 99219 PR INITIAL OBSERVATION CARE,LEVL II: CPT | Mod: FS,,, | Performed by: PHYSICIAN ASSISTANT

## 2022-06-20 PROCEDURE — 99219 PR INITIAL OBSERVATION CARE,LEVL II: ICD-10-PCS | Mod: FS,,, | Performed by: PHYSICIAN ASSISTANT

## 2022-06-20 PROCEDURE — 94761 N-INVAS EAR/PLS OXIMETRY MLT: CPT

## 2022-06-20 PROCEDURE — 88304 PR  SURG PATH,LEVEL III: ICD-10-PCS | Mod: 26,,, | Performed by: PATHOLOGY

## 2022-06-20 PROCEDURE — 27000221 HC OXYGEN, UP TO 24 HOURS

## 2022-06-20 PROCEDURE — 71000039 HC RECOVERY, EACH ADD'L HOUR: Performed by: SPECIALIST

## 2022-06-20 PROCEDURE — 71000033 HC RECOVERY, INTIAL HOUR: Performed by: SPECIALIST

## 2022-06-20 PROCEDURE — 25000242 PHARM REV CODE 250 ALT 637 W/ HCPCS: Performed by: SPECIALIST

## 2022-06-20 PROCEDURE — 25000003 PHARM REV CODE 250: Performed by: SPECIALIST

## 2022-06-20 PROCEDURE — 25000003 PHARM REV CODE 250: Performed by: ANESTHESIOLOGY

## 2022-06-20 RX ORDER — PROCHLORPERAZINE EDISYLATE 5 MG/ML
5 INJECTION INTRAMUSCULAR; INTRAVENOUS EVERY 30 MIN PRN
Status: DISCONTINUED | OUTPATIENT
Start: 2022-06-20 | End: 2022-06-20 | Stop reason: HOSPADM

## 2022-06-20 RX ORDER — KETOROLAC TROMETHAMINE 30 MG/ML
INJECTION, SOLUTION INTRAMUSCULAR; INTRAVENOUS
Status: DISCONTINUED | OUTPATIENT
Start: 2022-06-20 | End: 2022-06-20

## 2022-06-20 RX ORDER — PROPOFOL 10 MG/ML
VIAL (ML) INTRAVENOUS
Status: DISCONTINUED | OUTPATIENT
Start: 2022-06-20 | End: 2022-06-20

## 2022-06-20 RX ORDER — SODIUM CHLORIDE, SODIUM LACTATE, POTASSIUM CHLORIDE, CALCIUM CHLORIDE 600; 310; 30; 20 MG/100ML; MG/100ML; MG/100ML; MG/100ML
INJECTION, SOLUTION INTRAVENOUS CONTINUOUS
Status: DISCONTINUED | OUTPATIENT
Start: 2022-06-20 | End: 2022-06-21 | Stop reason: HOSPADM

## 2022-06-20 RX ORDER — ONDANSETRON HYDROCHLORIDE 2 MG/ML
INJECTION, SOLUTION INTRAMUSCULAR; INTRAVENOUS
Status: DISCONTINUED | OUTPATIENT
Start: 2022-06-20 | End: 2022-06-20

## 2022-06-20 RX ORDER — VECURONIUM BROMIDE FOR INJECTION 1 MG/ML
INJECTION, POWDER, LYOPHILIZED, FOR SOLUTION INTRAVENOUS
Status: DISCONTINUED | OUTPATIENT
Start: 2022-06-20 | End: 2022-06-20

## 2022-06-20 RX ORDER — ONDANSETRON 8 MG/1
8 TABLET, ORALLY DISINTEGRATING ORAL EVERY 6 HOURS PRN
Status: DISCONTINUED | OUTPATIENT
Start: 2022-06-20 | End: 2022-06-21 | Stop reason: HOSPADM

## 2022-06-20 RX ORDER — HYDROMORPHONE HYDROCHLORIDE 2 MG/ML
INJECTION, SOLUTION INTRAMUSCULAR; INTRAVENOUS; SUBCUTANEOUS
Status: DISCONTINUED | OUTPATIENT
Start: 2022-06-20 | End: 2022-06-20

## 2022-06-20 RX ORDER — LABETALOL HYDROCHLORIDE 5 MG/ML
10 INJECTION, SOLUTION INTRAVENOUS EVERY 6 HOURS PRN
Status: DISCONTINUED | OUTPATIENT
Start: 2022-06-20 | End: 2022-06-21 | Stop reason: HOSPADM

## 2022-06-20 RX ORDER — HYDROMORPHONE HYDROCHLORIDE 2 MG/ML
0.4 INJECTION, SOLUTION INTRAMUSCULAR; INTRAVENOUS; SUBCUTANEOUS EVERY 5 MIN PRN
Status: DISCONTINUED | OUTPATIENT
Start: 2022-06-20 | End: 2022-06-20 | Stop reason: HOSPADM

## 2022-06-20 RX ORDER — OXYCODONE HCL 5 MG/5 ML
5 SOLUTION, ORAL ORAL EVERY 4 HOURS PRN
Status: DISCONTINUED | OUTPATIENT
Start: 2022-06-20 | End: 2022-06-21 | Stop reason: HOSPADM

## 2022-06-20 RX ORDER — PROPOFOL 10 MG/ML
VIAL (ML) INTRAVENOUS CONTINUOUS PRN
Status: DISCONTINUED | OUTPATIENT
Start: 2022-06-20 | End: 2022-06-20

## 2022-06-20 RX ORDER — BUPIVACAINE HYDROCHLORIDE 2.5 MG/ML
INJECTION, SOLUTION EPIDURAL; INFILTRATION; INTRACAUDAL
Status: DISCONTINUED | OUTPATIENT
Start: 2022-06-20 | End: 2022-06-20 | Stop reason: HOSPADM

## 2022-06-20 RX ORDER — DIPHENHYDRAMINE HYDROCHLORIDE 50 MG/ML
25 INJECTION INTRAMUSCULAR; INTRAVENOUS EVERY 6 HOURS PRN
Status: DISCONTINUED | OUTPATIENT
Start: 2022-06-20 | End: 2022-06-20 | Stop reason: HOSPADM

## 2022-06-20 RX ORDER — DEXAMETHASONE SODIUM PHOSPHATE 4 MG/ML
INJECTION, SOLUTION INTRA-ARTICULAR; INTRALESIONAL; INTRAMUSCULAR; INTRAVENOUS; SOFT TISSUE
Status: DISCONTINUED | OUTPATIENT
Start: 2022-06-20 | End: 2022-06-20

## 2022-06-20 RX ORDER — LIDOCAINE HCL/PF 100 MG/5ML
SYRINGE (ML) INTRAVENOUS
Status: DISCONTINUED | OUTPATIENT
Start: 2022-06-20 | End: 2022-06-20

## 2022-06-20 RX ORDER — ACETAMINOPHEN 500 MG
1000 TABLET ORAL
Status: COMPLETED | OUTPATIENT
Start: 2022-06-20 | End: 2022-06-20

## 2022-06-20 RX ORDER — CEFAZOLIN SODIUM 2 G/50ML
2 SOLUTION INTRAVENOUS
Status: COMPLETED | OUTPATIENT
Start: 2022-06-20 | End: 2022-06-20

## 2022-06-20 RX ORDER — SODIUM CHLORIDE 0.9 % (FLUSH) 0.9 %
3 SYRINGE (ML) INJECTION
Status: DISCONTINUED | OUTPATIENT
Start: 2022-06-20 | End: 2022-06-21 | Stop reason: HOSPADM

## 2022-06-20 RX ORDER — OXYCODONE HCL 5 MG/5 ML
10 SOLUTION, ORAL ORAL EVERY 4 HOURS PRN
Status: DISCONTINUED | OUTPATIENT
Start: 2022-06-20 | End: 2022-06-21 | Stop reason: HOSPADM

## 2022-06-20 RX ORDER — OXYCODONE HYDROCHLORIDE 5 MG/1
5 TABLET ORAL
Status: DISCONTINUED | OUTPATIENT
Start: 2022-06-20 | End: 2022-06-20 | Stop reason: HOSPADM

## 2022-06-20 RX ORDER — DEXMEDETOMIDINE HYDROCHLORIDE 100 UG/ML
INJECTION, SOLUTION INTRAVENOUS
Status: DISCONTINUED | OUTPATIENT
Start: 2022-06-20 | End: 2022-06-20

## 2022-06-20 RX ORDER — SUCCINYLCHOLINE CHLORIDE 20 MG/ML
INJECTION INTRAMUSCULAR; INTRAVENOUS
Status: DISCONTINUED | OUTPATIENT
Start: 2022-06-20 | End: 2022-06-20

## 2022-06-20 RX ORDER — LIDOCAINE HYDROCHLORIDE 10 MG/ML
1 INJECTION, SOLUTION EPIDURAL; INFILTRATION; INTRACAUDAL; PERINEURAL ONCE
Status: DISCONTINUED | OUTPATIENT
Start: 2022-06-20 | End: 2022-06-20 | Stop reason: HOSPADM

## 2022-06-20 RX ORDER — MEPERIDINE HYDROCHLORIDE 25 MG/ML
12.5 INJECTION INTRAMUSCULAR; INTRAVENOUS; SUBCUTANEOUS ONCE AS NEEDED
Status: DISCONTINUED | OUTPATIENT
Start: 2022-06-20 | End: 2022-06-20 | Stop reason: HOSPADM

## 2022-06-20 RX ORDER — ROCURONIUM BROMIDE 10 MG/ML
INJECTION, SOLUTION INTRAVENOUS
Status: DISCONTINUED | OUTPATIENT
Start: 2022-06-20 | End: 2022-06-20

## 2022-06-20 RX ORDER — LISINOPRIL 5 MG/1
20 TABLET ORAL DAILY
Status: CANCELLED | OUTPATIENT
Start: 2022-06-20

## 2022-06-20 RX ORDER — FENTANYL CITRATE 50 UG/ML
INJECTION, SOLUTION INTRAMUSCULAR; INTRAVENOUS
Status: DISCONTINUED | OUTPATIENT
Start: 2022-06-20 | End: 2022-06-20

## 2022-06-20 RX ADMIN — DEXMEDETOMIDINE HYDROCHLORIDE 20 MCG: 100 INJECTION, SOLUTION, CONCENTRATE INTRAVENOUS at 12:06

## 2022-06-20 RX ADMIN — ONDANSETRON 4 MG: 2 INJECTION, SOLUTION INTRAMUSCULAR; INTRAVENOUS at 01:06

## 2022-06-20 RX ADMIN — ACETAMINOPHEN 1000 MG: 500 TABLET ORAL at 08:06

## 2022-06-20 RX ADMIN — HYDROMORPHONE HYDROCHLORIDE 1 MG: 2 INJECTION INTRAMUSCULAR; INTRAVENOUS; SUBCUTANEOUS at 02:06

## 2022-06-20 RX ADMIN — ROCURONIUM BROMIDE 10 MG: 10 INJECTION, SOLUTION INTRAVENOUS at 11:06

## 2022-06-20 RX ADMIN — SUCCINYLCHOLINE CHLORIDE 120 MG: 20 INJECTION, SOLUTION INTRAMUSCULAR; INTRAVENOUS at 11:06

## 2022-06-20 RX ADMIN — DEXAMETHASONE SODIUM PHOSPHATE 12 MG: 4 INJECTION, SOLUTION INTRAMUSCULAR; INTRAVENOUS at 12:06

## 2022-06-20 RX ADMIN — PROPOFOL 100 MCG/KG/MIN: 10 INJECTION, EMULSION INTRAVENOUS at 12:06

## 2022-06-20 RX ADMIN — VECURONIUM BROMIDE FOR INJECTION 1 MG: 1 INJECTION, POWDER, LYOPHILIZED, FOR SOLUTION INTRAVENOUS at 01:06

## 2022-06-20 RX ADMIN — SODIUM CHLORIDE, SODIUM LACTATE, POTASSIUM CHLORIDE, AND CALCIUM CHLORIDE: 600; 310; 30; 20 INJECTION, SOLUTION INTRAVENOUS at 11:06

## 2022-06-20 RX ADMIN — LIDOCAINE HYDROCHLORIDE 100 MG: 20 INJECTION, SOLUTION INTRAVENOUS at 11:06

## 2022-06-20 RX ADMIN — CARBOXYMETHYLCELLULOSE SODIUM 4 DROP: 2.5 SOLUTION/ DROPS OPHTHALMIC at 11:06

## 2022-06-20 RX ADMIN — SODIUM CHLORIDE, SODIUM LACTATE, POTASSIUM CHLORIDE, AND CALCIUM CHLORIDE: 600; 310; 30; 20 INJECTION, SOLUTION INTRAVENOUS at 12:06

## 2022-06-20 RX ADMIN — OXYCODONE HYDROCHLORIDE 10 MG: 5 SOLUTION ORAL at 05:06

## 2022-06-20 RX ADMIN — HYDROMORPHONE HYDROCHLORIDE 0.4 MG: 2 INJECTION INTRAMUSCULAR; INTRAVENOUS; SUBCUTANEOUS at 02:06

## 2022-06-20 RX ADMIN — PROPOFOL 250 MG: 10 INJECTION, EMULSION INTRAVENOUS at 11:06

## 2022-06-20 RX ADMIN — FENTANYL CITRATE 100 MCG: 50 INJECTION, SOLUTION INTRAMUSCULAR; INTRAVENOUS at 12:06

## 2022-06-20 RX ADMIN — KETOROLAC TROMETHAMINE 30 MG: 30 INJECTION, SOLUTION INTRAMUSCULAR; INTRAVENOUS at 02:06

## 2022-06-20 RX ADMIN — GLYCOPYRROLATE 0.2 MG: 0.2 INJECTION, SOLUTION INTRAMUSCULAR; INTRAVITREAL at 12:06

## 2022-06-20 RX ADMIN — PROPOFOL 30 MG: 10 INJECTION, EMULSION INTRAVENOUS at 12:06

## 2022-06-20 RX ADMIN — HYDROMORPHONE HYDROCHLORIDE 0.6 MG: 2 INJECTION INTRAMUSCULAR; INTRAVENOUS; SUBCUTANEOUS at 01:06

## 2022-06-20 RX ADMIN — CEFAZOLIN SODIUM 2 G: 2 SOLUTION INTRAVENOUS at 12:06

## 2022-06-20 RX ADMIN — SODIUM CHLORIDE, SODIUM LACTATE, POTASSIUM CHLORIDE, AND CALCIUM CHLORIDE: 600; 310; 30; 20 INJECTION, SOLUTION INTRAVENOUS at 03:06

## 2022-06-20 RX ADMIN — ROCURONIUM BROMIDE 40 MG: 10 INJECTION, SOLUTION INTRAVENOUS at 12:06

## 2022-06-20 RX ADMIN — FENTANYL CITRATE 100 MCG: 50 INJECTION, SOLUTION INTRAMUSCULAR; INTRAVENOUS at 11:06

## 2022-06-20 RX ADMIN — SODIUM CHLORIDE, SODIUM LACTATE, POTASSIUM CHLORIDE, AND CALCIUM CHLORIDE: 600; 310; 30; 20 INJECTION, SOLUTION INTRAVENOUS at 10:06

## 2022-06-20 RX ADMIN — OXYCODONE HYDROCHLORIDE 10 MG: 5 SOLUTION ORAL at 09:06

## 2022-06-20 RX ADMIN — VECURONIUM BROMIDE FOR INJECTION 3 MG: 1 INJECTION, POWDER, LYOPHILIZED, FOR SOLUTION INTRAVENOUS at 12:06

## 2022-06-20 RX ADMIN — SUGAMMADEX 200 MG: 100 INJECTION, SOLUTION INTRAVENOUS at 02:06

## 2022-06-20 NOTE — ANESTHESIA PROCEDURE NOTES
Intubation    Date/Time: 6/20/2022 11:55 AM  Performed by: Farheen Cespedes CRNA  Authorized by: Grzegorz Valdez MD     Intubation:     Induction:  Rapid sequence induction    Intubated:  Postinduction    Mask Ventilation:  N/a    Attempts:  1    Attempted By:  CRNA    Method of Intubation:  Video laryngoscopy    Blade:  Amezquita 4    Laryngeal View Grade: Grade IIA - cords partially seen      Difficult Airway Encountered?: No      Airway Device:  Oral nilsa    Airway Device Size:  8.0    Style/Cuff Inflation:  Cuffed    Inflation Amount (mL):  4    Tube secured:  20    Secured at:  The lips (Midline.)    Placement Verified By:  Capnometry    Complicating Factors:  Obesity and oropharyngeal edema or fat    Findings Post-Intubation:  BS equal bilateral

## 2022-06-20 NOTE — TRANSFER OF CARE
"Anesthesia Transfer of Care Note    Patient: Naresh Lancaster    Procedure(s) Performed: Procedure(s) (LRB):  UPPP (UVULOPALATOPHARYNGOPLASTY) (Bilateral)  GLOSSECTOMY, PARTIAL (Bilateral)    Patient location: PACU    Anesthesia Type: general    Transport from OR: Transported from OR on 6-10 L/min O2 by face mask with adequate spontaneous ventilation    Post pain: pain needs to be addressed (Additional Dilaudid as charted.)    Post assessment: no apparent anesthetic complications    Post vital signs: stable    Level of consciousness: awake    Nausea/Vomiting: no nausea/vomiting    Complications: none    Transfer of care protocol was followed      Last vitals:   Visit Vitals  BP (!) 151/99 (BP Location: Right arm, Patient Position: Lying)   Pulse 96   Temp 36.4 °C (97.5 °F) (Temporal)   Resp 18   Ht 5' 11" (1.803 m)   Wt 113.4 kg (250 lb)   SpO2 97%   BMI 34.87 kg/m²     "

## 2022-06-20 NOTE — HPI
Mr. Lancaster 51-year-old male with a past medical history of hypertension and obstructive sleep apnea.  He underwent tonsillectomy and uvulopalatopharyngoplasty under general anesthesia today with ENT Dr. Sun.  Surgery was without complications.  He is being admitted for overnight observation and monitoring of pulse oxygen levels and Hospital Medicine was consulted for medical management.  Patient was seen upon arrival the hospital floor, and he has no complaints other than some postop pain which is well managed with current pain regimen.

## 2022-06-20 NOTE — SUBJECTIVE & OBJECTIVE
Past Medical History:   Diagnosis Date    Hypertension     Respiratory distress     patient reports low oxygen after ankle surgery    Sleep apnea     unable to tolerate cpap       Past Surgical History:   Procedure Laterality Date    ANKLE FRACTURE SURGERY Left 2021       Review of patient's allergies indicates:  No Known Allergies    No current facility-administered medications on file prior to encounter.     Current Outpatient Medications on File Prior to Encounter   Medication Sig    fluticasone propionate (FLONASE) 50 mcg/actuation nasal spray 2 sprays (100 mcg total) by Each Nostril route once daily.    lisinopriL (PRINIVIL,ZESTRIL) 20 MG tablet Take 20 mg by mouth once daily.    loratadine (CLARITIN) 10 mg tablet Take 1 tablet (10 mg total) by mouth once daily.     Family History       Problem Relation (Age of Onset)    Pacemaker/defibrilator Mother          Tobacco Use    Smoking status: Former Smoker     Packs/day: 1.50     Years: 6.00     Pack years: 9.00    Smokeless tobacco: Never Used   Substance and Sexual Activity    Alcohol use: Yes     Comment: occasional    Drug use: Not on file    Sexual activity: Not on file     Review of Systems   Constitutional:  Negative for activity change, appetite change, chills, diaphoresis, fatigue and fever.   HENT:  Negative for congestion, dental problem, ear pain, rhinorrhea, trouble swallowing and voice change.    Eyes:  Negative for visual disturbance.   Respiratory:  Negative for cough, chest tightness, shortness of breath and wheezing.    Cardiovascular:  Negative for chest pain, palpitations and leg swelling.   Gastrointestinal:  Negative for abdominal distention, abdominal pain, blood in stool, constipation, diarrhea, nausea and vomiting.   Genitourinary:  Negative for difficulty urinating, dysuria, flank pain, frequency, hematuria and urgency.   Musculoskeletal:  Negative for arthralgias, back pain, gait problem, joint swelling, myalgias and neck pain.   Skin:   Negative for color change, rash and wound.   Neurological:  Negative for dizziness, syncope, speech difficulty, weakness, light-headedness, numbness and headaches.   Hematological:  Does not bruise/bleed easily.   Psychiatric/Behavioral:  Negative for confusion.    Objective:     Vital Signs (Most Recent):  Temp: 98.1 °F (36.7 °C) (06/20/22 1600)  Pulse: 97 (06/20/22 1600)  Resp: 16 (06/20/22 1600)  BP: (!) 143/98 (06/20/22 1600)  SpO2: 96 % (06/20/22 1547)   Vital Signs (24h Range):  Temp:  [97.5 °F (36.4 °C)-98.7 °F (37.1 °C)] 98.1 °F (36.7 °C)  Pulse:  [] 97  Resp:  [16-18] 16  SpO2:  [94 %-97 %] 96 %  BP: (143-164)/() 143/98     Weight: 113.4 kg (250 lb)  Body mass index is 34.87 kg/m².    Physical Exam  Vitals and nursing note reviewed.   Constitutional:       General: He is not in acute distress.     Appearance: Normal appearance. He is obese. He is not ill-appearing.   HENT:      Head: Normocephalic and atraumatic.   Eyes:      Extraocular Movements: Extraocular movements intact.      Conjunctiva/sclera: Conjunctivae normal.      Pupils: Pupils are equal, round, and reactive to light.   Cardiovascular:      Rate and Rhythm: Normal rate and regular rhythm.      Pulses: Normal pulses.      Heart sounds: Normal heart sounds. No murmur heard.  Pulmonary:      Effort: Pulmonary effort is normal. No respiratory distress.      Breath sounds: Normal breath sounds. No wheezing or rales.   Abdominal:      General: Bowel sounds are normal. There is no distension.      Palpations: Abdomen is soft.      Tenderness: There is no abdominal tenderness.   Musculoskeletal:         General: Normal range of motion.      Cervical back: Normal range of motion and neck supple.      Right lower leg: No edema.      Left lower leg: No edema.   Skin:     General: Skin is warm and dry.   Neurological:      General: No focal deficit present.      Mental Status: He is alert and oriented to person, place, and time. Mental status  is at baseline.   Psychiatric:         Behavior: Behavior normal.         Thought Content: Thought content normal.       Significant Labs: All pertinent labs within the past 24 hours have been reviewed.  CBC:   Recent Labs   Lab 06/20/22  0849   WBC 9.78   HGB 16.8   HCT 48.9          Significant Imaging: I have reviewed all pertinent imaging results/findings within the past 24 hours.

## 2022-06-20 NOTE — BRIEF OP NOTE
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  Surgery proceeded uneventfully.  He is being admitted for overnight observation and monitoring of pulse oxygen levels.    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 Right Tonsil2. Left Tonsil3. Uvula Soft Tissue Remnants     References:    Click here for ordering Quick Tip   Question Answer Comment   Procedure Type: ENT    Specimen Class: Routine/Screening    Which provider would you like to cc? KAILEE AVILES    Release to  patient Immediate        06/20/22 1410

## 2022-06-20 NOTE — ANESTHESIA POSTPROCEDURE EVALUATION
Anesthesia Post Evaluation    Patient: Naresh Lancaster    Procedure(s) Performed: Procedure(s) (LRB):  UPPP (UVULOPALATOPHARYNGOPLASTY) (Bilateral)  GLOSSECTOMY, PARTIAL (Bilateral)    Final Anesthesia Type: general      Patient location during evaluation: PACU  Patient participation: Yes- Able to Participate  Level of consciousness: awake and alert  Post-procedure vital signs: reviewed and stable  Pain management: adequate  Airway patency: patent    PONV status at discharge: No PONV  Anesthetic complications: no      Cardiovascular status: blood pressure returned to baseline  Respiratory status: unassisted and room air  Hydration status: euvolemic  Follow-up not needed.          Vitals Value Taken Time   /88 06/20/22 1517   Temp 36.6 °C (97.8 °F) 06/20/22 1515   Pulse 100 06/20/22 1529   Resp 16 06/20/22 1515   SpO2 97 % 06/20/22 1529   Vitals shown include unvalidated device data.      Event Time   Out of Recovery 15:37:00         Pain/Ashvin Score: Pain Rating Prior to Med Admin: 7 (6/20/2022  2:40 PM)  Pain Rating Post Med Admin: 5 (6/20/2022  3:30 PM)  Ashvin Score: 8 (6/20/2022  3:30 PM)

## 2022-06-20 NOTE — CONSULTS
[facility] Medicine  Consult Note    Patient Name: Naresh Lancaster  MRN: [id number]  Admission Date: 6/20/2022  Hospital Length of Stay: 0 days  Attending Physician: KAILEE Sun MD   Primary Care Provider: Primary Doctor No           Patient information was obtained from patient, past medical records and primary team.     Consults  Subjective:     Principal Problem: VAN (obstructive sleep apnea)    Chief Complaint: No chief complaint on file.       HPI: Mr. Lancaster 51-year-old male with a past medical history of hypertension and obstructive sleep apnea.  He underwent tonsillectomy and uvulopalatopharyngoplasty under general anesthesia today with ENT Dr. Sun.  Surgery was without complications.  He is being admitted for overnight observation and monitoring of pulse oxygen levels and Hospital Medicine was consulted for medical management.  Patient was seen upon arrival the hospital floor, and he has no complaints other than some postop pain which is well managed with current pain regimen.      Past Medical History:   Diagnosis Date    Hypertension     Respiratory distress     patient reports low oxygen after ankle surgery    Sleep apnea     unable to tolerate cpap       Past Surgical History:   Procedure Laterality Date    ANKLE FRACTURE SURGERY Left 2021       Review of patient's allergies indicates:  No Known Allergies    No current facility-administered medications on file prior to encounter.     Current Outpatient Medications on File Prior to Encounter   Medication Sig    fluticasone propionate (FLONASE) 50 mcg/actuation nasal spray 2 sprays (100 mcg total) by Each Nostril route once daily.    lisinopriL (PRINIVIL,ZESTRIL) 20 MG tablet Take 20 mg by mouth once daily.    loratadine (CLARITIN) 10 mg tablet Take 1 tablet (10 mg total) by mouth once daily.     Family History       Problem Relation (Age of Onset)    Pacemaker/defibrilator Mother          Tobacco Use    Smoking  status: Former Smoker     Packs/day: 1.50     Years: 6.00     Pack years: 9.00    Smokeless tobacco: Never Used   Substance and Sexual Activity    Alcohol use: Yes     Comment: occasional    Drug use: Not on file    Sexual activity: Not on file     Review of Systems   Constitutional:  Negative for activity change, appetite change, chills, diaphoresis, fatigue and fever.   HENT:  Negative for congestion, dental problem, ear pain, rhinorrhea, trouble swallowing and voice change.    Eyes:  Negative for visual disturbance.   Respiratory:  Negative for cough, chest tightness, shortness of breath and wheezing.    Cardiovascular:  Negative for chest pain, palpitations and leg swelling.   Gastrointestinal:  Negative for abdominal distention, abdominal pain, blood in stool, constipation, diarrhea, nausea and vomiting.   Genitourinary:  Negative for difficulty urinating, dysuria, flank pain, frequency, hematuria and urgency.   Musculoskeletal:  Negative for arthralgias, back pain, gait problem, joint swelling, myalgias and neck pain.   Skin:  Negative for color change, rash and wound.   Neurological:  Negative for dizziness, syncope, speech difficulty, weakness, light-headedness, numbness and headaches.   Hematological:  Does not bruise/bleed easily.   Psychiatric/Behavioral:  Negative for confusion.    Objective:     Vital Signs (Most Recent):  Temp: 98.1 °F (36.7 °C) (06/20/22 1600)  Pulse: 97 (06/20/22 1600)  Resp: 16 (06/20/22 1600)  BP: (!) 143/98 (06/20/22 1600)  SpO2: 96 % (06/20/22 1547)   Vital Signs (24h Range):  Temp:  [97.5 °F (36.4 °C)-98.7 °F (37.1 °C)] 98.1 °F (36.7 °C)  Pulse:  [] 97  Resp:  [16-18] 16  SpO2:  [94 %-97 %] 96 %  BP: (143-164)/() 143/98     Weight: 113.4 kg (250 lb)  Body mass index is 34.87 kg/m².    Physical Exam  Vitals and nursing note reviewed.   Constitutional:       General: He is not in acute distress.     Appearance: Normal appearance. He is obese. He is not  ill-appearing.   HENT:      Head: Normocephalic and atraumatic.   Eyes:      Extraocular Movements: Extraocular movements intact.      Conjunctiva/sclera: Conjunctivae normal.      Pupils: Pupils are equal, round, and reactive to light.   Cardiovascular:      Rate and Rhythm: Normal rate and regular rhythm.      Pulses: Normal pulses.      Heart sounds: Normal heart sounds. No murmur heard.  Pulmonary:      Effort: Pulmonary effort is normal. No respiratory distress.      Breath sounds: Normal breath sounds. No wheezing or rales.   Abdominal:      General: Bowel sounds are normal. There is no distension.      Palpations: Abdomen is soft.      Tenderness: There is no abdominal tenderness.   Musculoskeletal:         General: Normal range of motion.      Cervical back: Normal range of motion and neck supple.      Right lower leg: No edema.      Left lower leg: No edema.   Skin:     General: Skin is warm and dry.   Neurological:      General: No focal deficit present.      Mental Status: He is alert and oriented to person, place, and time. Mental status is at baseline.   Psychiatric:         Behavior: Behavior normal.         Thought Content: Thought content normal.       Significant Labs: All pertinent labs within the past 24 hours have been reviewed.  CBC:   Recent Labs   Lab 06/20/22  0849   WBC 9.78   HGB 16.8   HCT 48.9          Significant Imaging: I have reviewed all pertinent imaging results/findings within the past 24 hours.    Assessment/Plan:     * VAN (obstructive sleep apnea)  - s/p tonsillectomy, uvulopalatopharyngoplasty today  - care per ENT    Essential hypertension  - labetalol IV prn  - resume home lisinopril once ENT has cleared to swallow tablets      VTE Risk Mitigation (From admission, onward)         Ordered     Place sequential compression device  Until discontinued         06/20/22 1622     Place ASHLEY hose  Until discontinued         06/20/22 0806                    Thank you for your  consult. I will follow-up with patient. Please contact us if you have any additional questions.       Barbara Leos PA-C  Department of Hospital Medicine   [facility])

## 2022-06-21 VITALS
OXYGEN SATURATION: 95 % | HEIGHT: 71 IN | SYSTOLIC BLOOD PRESSURE: 154 MMHG | HEART RATE: 96 BPM | WEIGHT: 249.81 LBS | TEMPERATURE: 98 F | BODY MASS INDEX: 34.97 KG/M2 | RESPIRATION RATE: 22 BRPM | DIASTOLIC BLOOD PRESSURE: 101 MMHG

## 2022-06-21 LAB
ANION GAP SERPL CALC-SCNC: 11 MMOL/L (ref 8–16)
BASOPHILS # BLD AUTO: 0.01 K/UL (ref 0–0.2)
BASOPHILS NFR BLD: 0.1 % (ref 0–1.9)
BNP SERPL-MCNC: 51 PG/ML (ref 0–99)
BUN SERPL-MCNC: 8 MG/DL (ref 6–20)
CALCIUM SERPL-MCNC: 9.1 MG/DL (ref 8.7–10.5)
CHLORIDE SERPL-SCNC: 105 MMOL/L (ref 95–110)
CO2 SERPL-SCNC: 22 MMOL/L (ref 23–29)
CREAT SERPL-MCNC: 0.7 MG/DL (ref 0.5–1.4)
DIFFERENTIAL METHOD: ABNORMAL
EOSINOPHIL # BLD AUTO: 0 K/UL (ref 0–0.5)
EOSINOPHIL NFR BLD: 0 % (ref 0–8)
ERYTHROCYTE [DISTWIDTH] IN BLOOD BY AUTOMATED COUNT: 12.9 % (ref 11.5–14.5)
EST. GFR  (AFRICAN AMERICAN): >60 ML/MIN/1.73 M^2
EST. GFR  (NON AFRICAN AMERICAN): >60 ML/MIN/1.73 M^2
GLUCOSE SERPL-MCNC: 107 MG/DL (ref 70–110)
HCT VFR BLD AUTO: 40.8 % (ref 40–54)
HGB BLD-MCNC: 13.9 G/DL (ref 14–18)
IMM GRANULOCYTES # BLD AUTO: 0.05 K/UL (ref 0–0.04)
IMM GRANULOCYTES NFR BLD AUTO: 0.3 % (ref 0–0.5)
LYMPHOCYTES # BLD AUTO: 1.6 K/UL (ref 1–4.8)
LYMPHOCYTES NFR BLD: 11 % (ref 18–48)
MCH RBC QN AUTO: 30.6 PG (ref 27–31)
MCHC RBC AUTO-ENTMCNC: 34.1 G/DL (ref 32–36)
MCV RBC AUTO: 90 FL (ref 82–98)
MONOCYTES # BLD AUTO: 1 K/UL (ref 0.3–1)
MONOCYTES NFR BLD: 7 % (ref 4–15)
NEUTROPHILS # BLD AUTO: 11.9 K/UL (ref 1.8–7.7)
NEUTROPHILS NFR BLD: 81.6 % (ref 38–73)
NRBC BLD-RTO: 0 /100 WBC
PLATELET # BLD AUTO: 179 K/UL (ref 150–450)
PMV BLD AUTO: 10.5 FL (ref 9.2–12.9)
POTASSIUM SERPL-SCNC: 3.9 MMOL/L (ref 3.5–5.1)
RBC # BLD AUTO: 4.54 M/UL (ref 4.6–6.2)
SODIUM SERPL-SCNC: 138 MMOL/L (ref 136–145)
WBC # BLD AUTO: 14.59 K/UL (ref 3.9–12.7)

## 2022-06-21 PROCEDURE — 85025 COMPLETE CBC W/AUTO DIFF WBC: CPT | Performed by: INTERNAL MEDICINE

## 2022-06-21 PROCEDURE — 83880 ASSAY OF NATRIURETIC PEPTIDE: CPT | Performed by: INTERNAL MEDICINE

## 2022-06-21 PROCEDURE — 63600175 PHARM REV CODE 636 W HCPCS: Performed by: SPECIALIST

## 2022-06-21 PROCEDURE — 94761 N-INVAS EAR/PLS OXIMETRY MLT: CPT

## 2022-06-21 PROCEDURE — 80048 BASIC METABOLIC PNL TOTAL CA: CPT | Performed by: INTERNAL MEDICINE

## 2022-06-21 PROCEDURE — 99226 PR SUBSEQUENT OBSERVATION CARE,LEVEL III: CPT | Mod: ,,, | Performed by: INTERNAL MEDICINE

## 2022-06-21 PROCEDURE — 25000242 PHARM REV CODE 250 ALT 637 W/ HCPCS: Performed by: SPECIALIST

## 2022-06-21 PROCEDURE — 99226 PR SUBSEQUENT OBSERVATION CARE,LEVEL III: ICD-10-PCS | Mod: ,,, | Performed by: INTERNAL MEDICINE

## 2022-06-21 PROCEDURE — 36415 COLL VENOUS BLD VENIPUNCTURE: CPT | Performed by: INTERNAL MEDICINE

## 2022-06-21 RX ORDER — ONDANSETRON 8 MG/1
8 TABLET, ORALLY DISINTEGRATING ORAL EVERY 6 HOURS PRN
Qty: 10 TABLET | Refills: 2 | Status: SHIPPED | OUTPATIENT
Start: 2022-06-21 | End: 2022-07-07

## 2022-06-21 RX ORDER — AMOXICILLIN 400 MG/5ML
POWDER, FOR SUSPENSION ORAL
Qty: 200 ML | Refills: 0 | Status: SHIPPED | OUTPATIENT
Start: 2022-06-21 | End: 2022-07-07 | Stop reason: ALTCHOICE

## 2022-06-21 RX ORDER — LISINOPRIL 20 MG/1
20 TABLET ORAL DAILY
Status: DISCONTINUED | OUTPATIENT
Start: 2022-06-21 | End: 2022-06-21 | Stop reason: HOSPADM

## 2022-06-21 RX ORDER — OXYCODONE HCL 5 MG/5 ML
SOLUTION, ORAL ORAL
Qty: 420 ML | Refills: 0 | Status: SHIPPED | OUTPATIENT
Start: 2022-06-21 | End: 2022-07-07

## 2022-06-21 RX ADMIN — OXYCODONE HYDROCHLORIDE 10 MG: 5 SOLUTION ORAL at 01:06

## 2022-06-21 RX ADMIN — OXYCODONE HYDROCHLORIDE 10 MG: 5 SOLUTION ORAL at 05:06

## 2022-06-21 RX ADMIN — SODIUM CHLORIDE, SODIUM LACTATE, POTASSIUM CHLORIDE, AND CALCIUM CHLORIDE: 600; 310; 30; 20 INJECTION, SOLUTION INTRAVENOUS at 07:06

## 2022-06-21 NOTE — DISCHARGE SUMMARY
[facility]  Otorhinolaryngology-Head & Neck Surgery  Discharge Summary      Patient Name: Naresh Lancaster  MRN: [id number]  Admission Date: 6/20/2022  Hospital Length of Stay: 0 days  Discharge Date and Time:  06/21/2022 7:36 AM  Attending Physician: KAILEE Aviles MD   Discharging Provider: LEXIE Aviles MD  Primary Care Provider: Primary Doctor No     HPI:  The patient was admitted yesterday for uvulopalatopharyngoplasty and tonsillectomy.    Procedure(s) (LRB):  UPPP (UVULOPALATOPHARYNGOPLASTY) (Bilateral)  TONSILLECTOMY (bilateral)    Hospital Course:  The patient was admitted for UPPP and tonsillectomy and possible Coblation of the base of tongue.  Surgery proceeded uneventfully.  There was sufficient swelling in the lateral pharyngeal walls that Coblation was not performed.  The patient was admitted for overnight observation.  Pulse ox mulu was 91%.  Blood pressure was fluctuating.  He did require use of pain medication.  He is tolerating liquids and is being discharged on postop day 1 after being cleared by hospital medicine service.    Consults:   Consults (From admission, onward)        Status Ordering Provider     Inpatient consult to Hospital Medicine  Once        Provider:  Olive Duarte MD    Acknowledged KAILEE AVILES          Significant Diagnostic Studies:  Morning labs in process, no results available    Pending Diagnostic Studies:     Procedure Component Value Units Date/Time    Basic Metabolic Panel [[id number]] Collected: 06/21/22 0730    Order Status: Sent Lab Status: In process Updated: 06/21/22 0730    Specimen: Blood     Brain natriuretic peptide [[id number]] Collected: 06/21/22 0730    Order Status: Sent Lab Status: In process Updated: 06/21/22 0730    Specimen: Blood     CBC auto differential [[id number]] Collected: 06/21/22 0730    Order Status: Sent Lab Status: In process Updated: 06/21/22 0730    Specimen: Blood     Specimen to Pathology, Surgery ENT  [[id number]] Collected: 06/20/22 1414    Order Status: Sent Lab Status: In process Updated: 06/20/22 1647    Specimen: Tissue         Final Active Diagnoses:    Diagnosis Date Noted POA    PRINCIPAL PROBLEM:  VAN (obstructive sleep apnea) [G47.33] 08/18/2021 Yes    Essential hypertension [I10] 06/20/2022 Yes    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    Specialty: Otolaryngology  Contact information:  [address] [facility]  [address]rd Floor  [facility] [address]  [phone]                       Patient Instructions:      Diet clear liquid   Order Comments: Clear liquids at to be advanced to mechanical soft bland diet as tolerated.  No highly spicy foods or acidic foods.     Lifting restrictions   Order Comments: Lift nothing heavier than 10 lb  Ambulate freely about the house  No strenuous activities for 2 weeks.     Notify your health care provider if you experience any of the following:  temperature >100.4     Notify your health care provider if you experience any of the following:  persistent nausea and vomiting or diarrhea     Notify your health care provider if you experience any of the following:  severe uncontrolled pain     Notify your health care provider if you experience any of the following:  difficulty breathing or increased cough     Notify your health care provider if you experience any of the following:  severe persistent headache     Notify your health care provider if you experience any of the following:  worsening rash     Notify your health care provider if you experience any of the following:  persistent dizziness, light-headedness, or visual disturbances     Notify your health care provider if you  experience any of the following:  increased confusion or weakness     No dressing needed     Medications:  Reconciled Home Medications:      Medication List      START taking these medications    amoxicillin 400 mg/5 mL suspension  Commonly known as: AMOXIL  2 tsp by mouth twice daily     ondansetron 8 MG Tbdl  Commonly known as: ZOFRAN-ODT  Take 1 tablet (8 mg total) by mouth every 6 (six) hours as needed (Nausea or vomiting).     oxyCODONE 5 mg/5 mL Soln  Commonly known as: ROXICODONE  1-2 tsp every 4-6 hours as needed for pain        ASK your doctor about these medications    fluticasone propionate 50 mcg/actuation nasal spray  Commonly known as: FLONASE  2 sprays (100 mcg total) by Each Nostril route once daily.     lisinopriL 20 MG tablet  Commonly known as: PRINIVIL,ZESTRIL  Take 20 mg by mouth once daily.     loratadine 10 mg tablet  Commonly known as: CLARITIN  Take 1 tablet (10 mg total) by mouth once daily.            LEXIE Sun MD  Otorhinolaryngology-Head & Neck Surgery  [facility]

## 2022-06-21 NOTE — NURSING
Pt discharged to home with family. Pt states verbal understanding of all instructions. Pt given scripts for 3 medications to be filled at patient specific pharmacy. IV access x1 removed. Pt refused wheelchair and was escorted to elevator by nursing.  Pt safe.

## 2022-06-21 NOTE — PLAN OF CARE
Patient AAOX4 able to makes needs known . No c/o pain or discomfort noted . Ambulatory around room . Possible discharged . Plan of care discuss with patient verbalizes understanding .  Problem: Adult Inpatient Plan of Care  Goal: Plan of Care Review  Outcome: Met  Goal: Patient-Specific Goal (Individualized)  Outcome: Met  Goal: Absence of Hospital-Acquired Illness or Injury  Outcome: Met  Goal: Optimal Comfort and Wellbeing  Outcome: Met  Goal: Readiness for Transition of Care  Outcome: Met     Problem: Fall Injury Risk  Goal: Absence of Fall and Fall-Related Injury  Outcome: Met

## 2022-06-21 NOTE — PLAN OF CARE
Pt discharged home with family providing transportation.    [facility]eds [facility]'s outside [facility]..    No CM needs, pt is ready for discharge from CM perspective.    [facility] ([facility])  Discharge Final Note    Primary Care Provider: Primary Doctor No    Expected Discharge Date: 6/21/2022    Final Discharge Note (most recent)       Final Note - 06/21/22 0941          Final Note    Assessment Type Final Discharge Note (P)      Anticipated Discharge Disposition Home or Self Care (P)      What phone number can be called within the next 1-3 days to see how you are doing after discharge? [phone] (P)      Hospital Resources/Appts/Education Provided Appointments scheduled and added to AVS;Provided patient/caregiver with written discharge plan information (P)         Post-Acute Status    Discharge Delays None known at this time (P)                      Important Message from Medicare             Contact Info       R Gabe Sun MD   Specialty: Otolaryngology    [address]  [address]  [facility] [address]   Phone: [phone]       Next Steps: Follow up on 6/24/2022

## 2022-06-21 NOTE — PROGRESS NOTES
Patient denies pain. VSS. Call light within reach. Bedside handoff report given to MARIA LUZ Mejia.

## 2022-06-21 NOTE — SUBJECTIVE & OBJECTIVE
Interval History: Patient is awake and alert.  His son is at bedside.  Some residual post-op pain O/P.  No chest pain or SOB.     Review of Systems   Constitutional:  Negative for activity change, appetite change, chills, diaphoresis, fatigue and fever.   HENT:  Positive for sore throat. Negative for congestion, dental problem, ear pain, rhinorrhea, trouble swallowing and voice change.    Eyes:  Negative for visual disturbance.   Respiratory:  Negative for cough, chest tightness, shortness of breath and wheezing.    Cardiovascular:  Negative for chest pain, palpitations and leg swelling.   Gastrointestinal:  Negative for abdominal distention, abdominal pain, blood in stool, constipation, diarrhea, nausea and vomiting.   Genitourinary:  Negative for difficulty urinating, dysuria, flank pain, frequency, hematuria and urgency.   Musculoskeletal:  Negative for arthralgias, back pain, gait problem, joint swelling, myalgias and neck pain.   Skin:  Negative for color change, rash and wound.   Neurological:  Negative for dizziness, syncope, speech difficulty, weakness, light-headedness, numbness and headaches.   Hematological:  Does not bruise/bleed easily.   Psychiatric/Behavioral:  Negative for confusion.    Objective:     Vital Signs (Most Recent):  Temp: 97.5 °F (36.4 °C) (06/21/22 0739)  Pulse: 96 (06/21/22 0739)  Resp: (!) 22 (06/21/22 0739)  BP: (!) 154/101 (06/21/22 0739)  SpO2: 95 % (06/21/22 0751)   Vital Signs (24h Range):  Temp:  [97.5 °F (36.4 °C)-98.2 °F (36.8 °C)] 97.5 °F (36.4 °C)  Pulse:  [] 96  Resp:  [16-22] 22  SpO2:  [91 %-97 %] 95 %  BP: (143-172)/() 154/101     Weight: 113.3 kg (249 lb 12.5 oz)  Body mass index is 34.84 kg/m².    Intake/Output Summary (Last 24 hours) at 6/21/2022 0946  Last data filed at 6/21/2022 0900  Gross per 24 hour   Intake 4613.72 ml   Output 700 ml   Net 3913.72 ml      Physical Exam  Vitals and nursing note reviewed.   Constitutional:       General: He is not in  acute distress.     Appearance: Normal appearance. He is obese. He is not ill-appearing.   HENT:      Head: Normocephalic and atraumatic.      Right Ear: External ear normal.      Left Ear: External ear normal.      Nose: Nose normal.      Mouth/Throat:      Mouth: Mucous membranes are moist.      Pharynx: Oropharynx is clear.   Eyes:      General: No scleral icterus.     Extraocular Movements: Extraocular movements intact.      Conjunctiva/sclera: Conjunctivae normal.      Pupils: Pupils are equal, round, and reactive to light.   Cardiovascular:      Rate and Rhythm: Normal rate and regular rhythm.      Pulses: Normal pulses.      Heart sounds: Normal heart sounds. No murmur heard.  Pulmonary:      Effort: Pulmonary effort is normal. No respiratory distress.      Breath sounds: Normal breath sounds. No wheezing or rales.   Abdominal:      General: Bowel sounds are normal. There is no distension.      Palpations: Abdomen is soft.      Tenderness: There is no abdominal tenderness.   Musculoskeletal:         General: Normal range of motion.      Cervical back: Normal range of motion and neck supple.      Right lower leg: No edema.      Left lower leg: No edema.   Skin:     General: Skin is warm and dry.   Neurological:      General: No focal deficit present.      Mental Status: He is alert and oriented to person, place, and time. Mental status is at baseline.   Psychiatric:         Behavior: Behavior normal.         Thought Content: Thought content normal.       Significant Labs: All pertinent labs within the past 24 hours have been reviewed.    Significant Imaging: I have reviewed all pertinent imaging results/findings within the past 24 hours.

## 2022-06-21 NOTE — ASSESSMENT & PLAN NOTE
S/p Tonsillectomy and Uvulopalatopharyngoplasty on 6/20/2022 - POD#1  -Care per ENT  -To be discharged home today

## 2022-06-21 NOTE — DISCHARGE INSTRUCTIONS
AAOX4 . Plan of care discuss with patient . Patient verbalizes understanding . Educated patient on attending his follow up appt .

## 2022-06-21 NOTE — PROGRESS NOTES
[facility] Medicine  Progress Note    Patient Name: Naresh Lancaster  MRN: [id number]  Patient Class: OP- Outpatient Recovery   Admission Date: 6/20/2022  Length of Stay: 0 days  Attending Physician: No att. providers found  Primary Care Provider: Primary Doctor No        Subjective:     Principal Problem:VAN (obstructive sleep apnea)      HPI:  Mr. Lancaster 51-year-old male with a past medical history of hypertension and obstructive sleep apnea.  He underwent tonsillectomy and uvulopalatopharyngoplasty under general anesthesia today with ENT Dr. Sun.  Surgery was without complications.  He is being admitted for overnight observation and monitoring of pulse oxygen levels and Hospital Medicine was consulted for medical management.  Patient was seen upon arrival the hospital floor, and he has no complaints other than some postop pain which is well managed with current pain regimen.      Overview/Hospital Course:  See below    Interval History: Patient is awake and alert.  His son is at bedside.  Some residual post-op pain O/P.  No chest pain or SOB.     Review of Systems   Constitutional:  Negative for activity change, appetite change, chills, diaphoresis, fatigue and fever.   HENT:  Positive for sore throat. Negative for congestion, dental problem, ear pain, rhinorrhea, trouble swallowing and voice change.    Eyes:  Negative for visual disturbance.   Respiratory:  Negative for cough, chest tightness, shortness of breath and wheezing.    Cardiovascular:  Negative for chest pain, palpitations and leg swelling.   Gastrointestinal:  Negative for abdominal distention, abdominal pain, blood in stool, constipation, diarrhea, nausea and vomiting.   Genitourinary:  Negative for difficulty urinating, dysuria, flank pain, frequency, hematuria and urgency.   Musculoskeletal:  Negative for arthralgias, back pain, gait problem, joint swelling, myalgias and neck pain.   Skin:  Negative for color change,  rash and wound.   Neurological:  Negative for dizziness, syncope, speech difficulty, weakness, light-headedness, numbness and headaches.   Hematological:  Does not bruise/bleed easily.   Psychiatric/Behavioral:  Negative for confusion.    Objective:     Vital Signs (Most Recent):  Temp: 97.5 °F (36.4 °C) (06/21/22 0739)  Pulse: 96 (06/21/22 0739)  Resp: (!) 22 (06/21/22 0739)  BP: (!) 154/101 (06/21/22 0739)  SpO2: 95 % (06/21/22 0751)   Vital Signs (24h Range):  Temp:  [97.5 °F (36.4 °C)-98.2 °F (36.8 °C)] 97.5 °F (36.4 °C)  Pulse:  [] 96  Resp:  [16-22] 22  SpO2:  [91 %-97 %] 95 %  BP: (143-172)/() 154/101     Weight: 113.3 kg (249 lb 12.5 oz)  Body mass index is 34.84 kg/m².    Intake/Output Summary (Last 24 hours) at 6/21/2022 0946  Last data filed at 6/21/2022 0900  Gross per 24 hour   Intake 4613.72 ml   Output 700 ml   Net 3913.72 ml      Physical Exam  Vitals and nursing note reviewed.   Constitutional:       General: He is not in acute distress.     Appearance: Normal appearance. He is obese. He is not ill-appearing.   HENT:      Head: Normocephalic and atraumatic.      Right Ear: External ear normal.      Left Ear: External ear normal.      Nose: Nose normal.      Mouth/Throat:      Mouth: Mucous membranes are moist.      Pharynx: Oropharynx is clear.   Eyes:      General: No scleral icterus.     Extraocular Movements: Extraocular movements intact.      Conjunctiva/sclera: Conjunctivae normal.      Pupils: Pupils are equal, round, and reactive to light.   Cardiovascular:      Rate and Rhythm: Normal rate and regular rhythm.      Pulses: Normal pulses.      Heart sounds: Normal heart sounds. No murmur heard.  Pulmonary:      Effort: Pulmonary effort is normal. No respiratory distress.      Breath sounds: Normal breath sounds. No wheezing or rales.   Abdominal:      General: Bowel sounds are normal. There is no distension.      Palpations: Abdomen is soft.      Tenderness: There is no abdominal  tenderness.   Musculoskeletal:         General: Normal range of motion.      Cervical back: Normal range of motion and neck supple.      Right lower leg: No edema.      Left lower leg: No edema.   Skin:     General: Skin is warm and dry.   Neurological:      General: No focal deficit present.      Mental Status: He is alert and oriented to person, place, and time. Mental status is at baseline.   Psychiatric:         Behavior: Behavior normal.         Thought Content: Thought content normal.       Significant Labs: All pertinent labs within the past 24 hours have been reviewed.    Significant Imaging: I have reviewed all pertinent imaging results/findings within the past 24 hours.      Assessment/Plan:      * VAN (obstructive sleep apnea)  S/p Tonsillectomy and Uvulopalatopharyngoplasty on 6/20/2022 - POD#1  -Care per ENT  -To be discharged home today    Essential hypertension  Home Meds:  Lisinopril 20mg  SBP a little elevated.  -Resume home Lisinopril       VTE Risk Mitigation (From admission, onward)         Ordered     Place sequential compression device  Until discontinued         06/20/22 1622     Place ASHLEY hose  Until discontinued         06/20/22 0806                Discharge Planning   YEFRI: 6/21/2022     Code Status: Full Code   Is the patient medically ready for discharge?:     Reason for patient still in hospital (select all that apply): Patient trending condition, Treatment, Consult recommendations and Pending disposition      Discharge Delays: None known at this time              Jasvir Liang MD  Department of Hospital Medicine   [facility] - Med Surg ([facility])

## 2022-06-22 NOTE — OP NOTE
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  He was prepped and draped in usual fashion a Eris Terry mouth gag was placed in the mouth, common suspended from a Zafar stand.  Using digital palpation the estimated amount of soft palate to be removed was marked.  The tonsils were removed initially, 1st the right than the left.  The tonsil was grasped with a curved Allis clamp and the mucosa of the superior pole the tonsil was incised with the Bovie.  Using tip of Bovie is blunt detector additional fibromuscular attachments attachments to the superior pole of the tonsil were divided and the tonsil was regrasped at its superior pole with a straight Allis clamp.  The tonsil was retracted superiorly and medially.  Incision was carried down the anterior tonsillar pillar and a subcapsular dissection proceeded from superiorly and inferiorly and anteriorly posteriorly.  The mucosa of the  posterior pillar was incised and the inferior pole vascular attachments were dissected, cauterized and divided.  The tonsil was removed.  There was 1 active bleeding vessel which was clamped with a tonsil hemostat and cauterized.  Identical procedure was performed on the left.  The lingual surface of the soft palate mucosa was incised with a number 12 scapel and the muscles of the soft palate word dissected bilayer.  Hemostasis was secured using bipolar cautery.  When the nasopharyngeal mucosa was identify a 5 mm flap of mucosa was dissected from the muscle and the palatal remnant and uvula removed.  Next the middle pharyngeal constrictor was undermined.  Hemostasis was secured with bipolar cautery.  The middle pharyngeal constrictor was then plicated in a deep layer using 2-0 Vicryl with interrupted simple stitches with buried knots to the palate so glossitis muscle.  A 2nd more superficial layer plication was accomplished using 3-0 Vicryl with interrupted simple stitches with buried knots.  Both layers of plication were performed bilaterally.  There was a low tension closure mucosa to mucosa then using interrupted simple stitches of 4-0 chromic catgut with 7 knots per stitch.  There was sufficient swelling in the lateral pharyngeal wall that I decided intraoperatively to forego performing a Coblation of the tongue base because of airway concerns.  The patient was left to awaken in the operating suite where he was extubated.  He was transferred to recovery in stable and good condition.  Estimated blood loss is 100 mL.  The patient tolerated the procedure well.        Description of Technical Procedures:  1. Uvulopalatopharyngoplasty, 2. Bilateral tonsillectomy    Significant Surgical Tasks Conducted by the Assistant(s), if Applicable:  None, I performed the entire case    Estimated Blood Loss (EBL): 100 mL           Implants: * No implants in log *    Specimens:   Specimen (24h ago, onward)            1. Right  tonsil  2. Left tonsil  3. Uvula and soft palate remnant                   Condition: Good    Disposition: PACU - hemodynamically stable.    Attestation: I performed the procedure.

## 2022-06-24 ENCOUNTER — OFFICE VISIT (OUTPATIENT)
Dept: OTOLARYNGOLOGY | Facility: CLINIC | Age: 52
End: 2022-06-24
Payer: MEDICAID

## 2022-06-24 VITALS
DIASTOLIC BLOOD PRESSURE: 95 MMHG | WEIGHT: 241.75 LBS | TEMPERATURE: 98 F | HEART RATE: 95 BPM | SYSTOLIC BLOOD PRESSURE: 148 MMHG | BODY MASS INDEX: 33.72 KG/M2

## 2022-06-24 DIAGNOSIS — K13.79 HYPERTROPHIC SOFT PALATE: ICD-10-CM

## 2022-06-24 DIAGNOSIS — R13.10 ODYNOPHAGIA: ICD-10-CM

## 2022-06-24 DIAGNOSIS — T73.0XXA STARVATION, INITIAL ENCOUNTER: ICD-10-CM

## 2022-06-24 DIAGNOSIS — Z98.890 POSTOPERATIVE STATE: ICD-10-CM

## 2022-06-24 DIAGNOSIS — K13.79 HYPERTROPHY OF UVULA: ICD-10-CM

## 2022-06-24 DIAGNOSIS — G47.33 OSA (OBSTRUCTIVE SLEEP APNEA): Primary | ICD-10-CM

## 2022-06-24 DIAGNOSIS — K14.8 ACQUIRED MACROGLOSSIA: ICD-10-CM

## 2022-06-24 PROCEDURE — 3077F PR MOST RECENT SYSTOLIC BLOOD PRESSURE >= 140 MM HG: ICD-10-PCS | Mod: CPTII,,, | Performed by: SPECIALIST

## 2022-06-24 PROCEDURE — 1159F MED LIST DOCD IN RCRD: CPT | Mod: CPTII,,, | Performed by: SPECIALIST

## 2022-06-24 PROCEDURE — 99024 POSTOP FOLLOW-UP VISIT: CPT | Mod: ,,, | Performed by: SPECIALIST

## 2022-06-24 PROCEDURE — 3077F SYST BP >= 140 MM HG: CPT | Mod: CPTII,,, | Performed by: SPECIALIST

## 2022-06-24 PROCEDURE — 96372 THER/PROPH/DIAG INJ SC/IM: CPT | Mod: PBBFAC,PN

## 2022-06-24 PROCEDURE — 4010F ACE/ARB THERAPY RXD/TAKEN: CPT | Mod: CPTII,,, | Performed by: SPECIALIST

## 2022-06-24 PROCEDURE — 3008F PR BODY MASS INDEX (BMI) DOCUMENTED: ICD-10-PCS | Mod: CPTII,,, | Performed by: SPECIALIST

## 2022-06-24 PROCEDURE — 3080F PR MOST RECENT DIASTOLIC BLOOD PRESSURE >= 90 MM HG: ICD-10-PCS | Mod: CPTII,,, | Performed by: SPECIALIST

## 2022-06-24 PROCEDURE — 4010F PR ACE/ARB THEARPY RXD/TAKEN: ICD-10-PCS | Mod: CPTII,,, | Performed by: SPECIALIST

## 2022-06-24 PROCEDURE — 99999 PR PBB SHADOW E&M-EST. PATIENT-LVL III: CPT | Mod: PBBFAC,,, | Performed by: SPECIALIST

## 2022-06-24 PROCEDURE — 1159F PR MEDICATION LIST DOCUMENTED IN MEDICAL RECORD: ICD-10-PCS | Mod: CPTII,,, | Performed by: SPECIALIST

## 2022-06-24 PROCEDURE — 1160F PR REVIEW ALL MEDS BY PRESCRIBER/CLIN PHARMACIST DOCUMENTED: ICD-10-PCS | Mod: CPTII,,, | Performed by: SPECIALIST

## 2022-06-24 PROCEDURE — 99024 PR POST-OP FOLLOW-UP VISIT: ICD-10-PCS | Mod: ,,, | Performed by: SPECIALIST

## 2022-06-24 PROCEDURE — 99999 PR PBB SHADOW E&M-EST. PATIENT-LVL III: ICD-10-PCS | Mod: PBBFAC,,, | Performed by: SPECIALIST

## 2022-06-24 PROCEDURE — 1160F RVW MEDS BY RX/DR IN RCRD: CPT | Mod: CPTII,,, | Performed by: SPECIALIST

## 2022-06-24 PROCEDURE — 99213 OFFICE O/P EST LOW 20 MIN: CPT | Mod: PBBFAC,25,PN | Performed by: SPECIALIST

## 2022-06-24 PROCEDURE — 3080F DIAST BP >= 90 MM HG: CPT | Mod: CPTII,,, | Performed by: SPECIALIST

## 2022-06-24 PROCEDURE — 3008F BODY MASS INDEX DOCD: CPT | Mod: CPTII,,, | Performed by: SPECIALIST

## 2022-06-24 RX ORDER — TRIAMCINOLONE ACETONIDE 40 MG/ML
40 INJECTION, SUSPENSION INTRA-ARTICULAR; INTRAMUSCULAR ONCE
Status: COMPLETED | OUTPATIENT
Start: 2022-06-24 | End: 2022-06-24

## 2022-06-24 RX ADMIN — TRIAMCINOLONE ACETONIDE 40 MG: 40 INJECTION, SUSPENSION INTRA-ARTICULAR; INTRAMUSCULAR at 09:06

## 2022-06-24 NOTE — PROGRESS NOTES
Subjective:       Patient ID: Naresh Lancaster is a 51 y.o. male.    Chief Complaint: Post-op Evaluation (With pain )    Three days postop:    The patient is having great deal a tickling cult he swallowing.  He is not eating any food.  He has not been able to take his blood pressure medicine occasions because of severe pain.  He is using his liquid oxycodone, which does help to decrease the pain.  He is having some nasal regurgitation of liquids if he takes a large swallow of liquids.    Review of Systems   Constitutional: Positive for fatigue. Negative for fever.   HENT: Positive for postnasal drip, sore throat, trouble swallowing and voice change. Negative for congestion, nosebleeds, rhinorrhea, sinus pressure, sinus pain and sneezing.    Respiratory: Negative for cough and choking.        Objective:      Physical Exam  Vitals and nursing note reviewed.   Constitutional:       General: He is not in acute distress.     Appearance: Normal appearance. He is normal weight.      Comments: Weight-decrease 8 lb from preoperative weight   HENT:      Head: Normocephalic.      Right Ear: Tympanic membrane, ear canal and external ear normal.      Left Ear: Tympanic membrane, ear canal and external ear normal.      Nose: Nose normal.      Mouth/Throat:      Mouth: Mucous membranes are moist.      Comments: Oral cavity-swelling of the soft palate remnant with eschar along the edge of the suture line  Eyes:      Extraocular Movements: Extraocular movements intact.      Conjunctiva/sclera: Conjunctivae normal.      Pupils: Pupils are equal, round, and reactive to light.   Musculoskeletal:      Cervical back: Normal range of motion and neck supple.   Neurological:      Mental Status: He is alert.         Assessment:       1. VAN (obstructive sleep apnea)    2. Acquired macroglossia    3. Hypertrophic soft palate    4. Hypertrophy of uvula    5. Starvation, initial encounter    6. Odynophagia    7. Postoperative state        Plan:        I am giving the patient a Kenalog 40 injection.  I will have him follow-up with an alternate provider next week) I will be on vacation) and I will see him in 2 weeks.

## 2022-06-27 LAB
FINAL PATHOLOGIC DIAGNOSIS: NORMAL
Lab: NORMAL

## 2022-06-29 ENCOUNTER — OFFICE VISIT (OUTPATIENT)
Dept: OTOLARYNGOLOGY | Facility: CLINIC | Age: 52
End: 2022-06-29
Payer: MEDICAID

## 2022-06-29 DIAGNOSIS — K13.79 HYPERTROPHIC SOFT PALATE: ICD-10-CM

## 2022-06-29 DIAGNOSIS — K13.79 HYPERTROPHY OF UVULA: ICD-10-CM

## 2022-06-29 DIAGNOSIS — T73.0XXA STARVATION, INITIAL ENCOUNTER: ICD-10-CM

## 2022-06-29 DIAGNOSIS — K14.8 ACQUIRED MACROGLOSSIA: ICD-10-CM

## 2022-06-29 DIAGNOSIS — G47.33 OSA (OBSTRUCTIVE SLEEP APNEA): ICD-10-CM

## 2022-06-29 DIAGNOSIS — R13.10 ODYNOPHAGIA: ICD-10-CM

## 2022-06-29 DIAGNOSIS — Z98.890 POST-OPERATIVE STATE: Primary | ICD-10-CM

## 2022-06-29 PROCEDURE — 1160F PR REVIEW ALL MEDS BY PRESCRIBER/CLIN PHARMACIST DOCUMENTED: ICD-10-PCS | Mod: CPTII,,, | Performed by: NURSE PRACTITIONER

## 2022-06-29 PROCEDURE — 4010F PR ACE/ARB THEARPY RXD/TAKEN: ICD-10-PCS | Mod: CPTII,,, | Performed by: NURSE PRACTITIONER

## 2022-06-29 PROCEDURE — 99024 POSTOP FOLLOW-UP VISIT: CPT | Mod: ,,, | Performed by: NURSE PRACTITIONER

## 2022-06-29 PROCEDURE — 1160F RVW MEDS BY RX/DR IN RCRD: CPT | Mod: CPTII,,, | Performed by: NURSE PRACTITIONER

## 2022-06-29 PROCEDURE — 99212 OFFICE O/P EST SF 10 MIN: CPT | Mod: PBBFAC,PN | Performed by: NURSE PRACTITIONER

## 2022-06-29 PROCEDURE — 99999 PR PBB SHADOW E&M-EST. PATIENT-LVL II: CPT | Mod: PBBFAC,,, | Performed by: NURSE PRACTITIONER

## 2022-06-29 PROCEDURE — 99999 PR PBB SHADOW E&M-EST. PATIENT-LVL II: ICD-10-PCS | Mod: PBBFAC,,, | Performed by: NURSE PRACTITIONER

## 2022-06-29 PROCEDURE — 4010F ACE/ARB THERAPY RXD/TAKEN: CPT | Mod: CPTII,,, | Performed by: NURSE PRACTITIONER

## 2022-06-29 PROCEDURE — 1159F PR MEDICATION LIST DOCUMENTED IN MEDICAL RECORD: ICD-10-PCS | Mod: CPTII,,, | Performed by: NURSE PRACTITIONER

## 2022-06-29 PROCEDURE — 1159F MED LIST DOCD IN RCRD: CPT | Mod: CPTII,,, | Performed by: NURSE PRACTITIONER

## 2022-06-29 PROCEDURE — 99024 PR POST-OP FOLLOW-UP VISIT: ICD-10-PCS | Mod: ,,, | Performed by: NURSE PRACTITIONER

## 2022-06-29 NOTE — PROGRESS NOTES
Subjective:       Patient ID: Naresh Lancaster is a 51 y.o. male.    Chief Complaint: Post-op Problem    Nine days postop:    The patient reports doing better today.  He is able to eat soft foods.  He has is able to take his blood pressure medicine now.  He reports the throat pain has greatly improved.  He is having less nasal regurgitation of liquids when he takes a large swallow of liquids. He reports no longer having bleeding.    Review of Systems   Constitutional: Negative for fatigue and fever.   HENT: Positive for postnasal drip, sore throat, trouble swallowing and voice change. Negative for congestion, nosebleeds, rhinorrhea, sinus pressure, sinus pain and sneezing.    Respiratory: Negative for cough and choking.        Objective:      Physical Exam  Vitals and nursing note reviewed.   Constitutional:       General: He is not in acute distress.     Appearance: Normal appearance. He is normal weight.   HENT:      Head: Normocephalic.      Right Ear: Tympanic membrane, ear canal and external ear normal.      Left Ear: Tympanic membrane, ear canal and external ear normal.      Nose: Nose normal.      Mouth/Throat:      Mouth: Mucous membranes are moist.      Comments: Oral cavity-swelling of the soft palate remnant with eschar along the edge of the suture line  Eyes:      Extraocular Movements: Extraocular movements intact.      Conjunctiva/sclera: Conjunctivae normal.      Pupils: Pupils are equal, round, and reactive to light.   Musculoskeletal:      Cervical back: Normal range of motion and neck supple.   Neurological:      Mental Status: He is alert.         Assessment:       1. Post-operative state    2. VAN (obstructive sleep apnea)    3. Acquired macroglossia    4. Hypertrophic soft palate    5. Hypertrophy of uvula    6. Starvation, initial encounter    7. Odynophagia        Plan:     Patient doing well. Healing well. He is able to tolerate soft foods and tolerate liquids. I recommend warm salt water  gargling as needed for comfort.     He will follow up with Dr. Sun on 7/7/22. He may contact me if he has any issues or questions between now and then.

## 2022-07-07 ENCOUNTER — OFFICE VISIT (OUTPATIENT)
Dept: OTOLARYNGOLOGY | Facility: CLINIC | Age: 52
End: 2022-07-07
Payer: MEDICAID

## 2022-07-07 VITALS
DIASTOLIC BLOOD PRESSURE: 89 MMHG | SYSTOLIC BLOOD PRESSURE: 138 MMHG | TEMPERATURE: 98 F | WEIGHT: 245.69 LBS | BODY MASS INDEX: 34.27 KG/M2 | HEART RATE: 86 BPM

## 2022-07-07 DIAGNOSIS — Z98.890 POST-OPERATIVE STATE: Primary | ICD-10-CM

## 2022-07-07 DIAGNOSIS — J30.9 ALLERGIC RHINITIS, UNSPECIFIED SEASONALITY, UNSPECIFIED TRIGGER: ICD-10-CM

## 2022-07-07 DIAGNOSIS — J34.2 NASAL SEPTAL DEVIATION: ICD-10-CM

## 2022-07-07 PROCEDURE — 99024 POSTOP FOLLOW-UP VISIT: CPT | Mod: ,,, | Performed by: SPECIALIST

## 2022-07-07 PROCEDURE — 4010F PR ACE/ARB THEARPY RXD/TAKEN: ICD-10-PCS | Mod: CPTII,,, | Performed by: SPECIALIST

## 2022-07-07 PROCEDURE — 1160F PR REVIEW ALL MEDS BY PRESCRIBER/CLIN PHARMACIST DOCUMENTED: ICD-10-PCS | Mod: CPTII,,, | Performed by: SPECIALIST

## 2022-07-07 PROCEDURE — 1160F RVW MEDS BY RX/DR IN RCRD: CPT | Mod: CPTII,,, | Performed by: SPECIALIST

## 2022-07-07 PROCEDURE — 99999 PR PBB SHADOW E&M-EST. PATIENT-LVL III: CPT | Mod: PBBFAC,,, | Performed by: SPECIALIST

## 2022-07-07 PROCEDURE — 3079F DIAST BP 80-89 MM HG: CPT | Mod: CPTII,,, | Performed by: SPECIALIST

## 2022-07-07 PROCEDURE — 99213 OFFICE O/P EST LOW 20 MIN: CPT | Mod: PBBFAC,PN | Performed by: SPECIALIST

## 2022-07-07 PROCEDURE — 99024 PR POST-OP FOLLOW-UP VISIT: ICD-10-PCS | Mod: ,,, | Performed by: SPECIALIST

## 2022-07-07 PROCEDURE — 3075F SYST BP GE 130 - 139MM HG: CPT | Mod: CPTII,,, | Performed by: SPECIALIST

## 2022-07-07 PROCEDURE — 99999 PR PBB SHADOW E&M-EST. PATIENT-LVL III: ICD-10-PCS | Mod: PBBFAC,,, | Performed by: SPECIALIST

## 2022-07-07 PROCEDURE — 1159F MED LIST DOCD IN RCRD: CPT | Mod: CPTII,,, | Performed by: SPECIALIST

## 2022-07-07 PROCEDURE — 1159F PR MEDICATION LIST DOCUMENTED IN MEDICAL RECORD: ICD-10-PCS | Mod: CPTII,,, | Performed by: SPECIALIST

## 2022-07-07 PROCEDURE — 3008F PR BODY MASS INDEX (BMI) DOCUMENTED: ICD-10-PCS | Mod: CPTII,,, | Performed by: SPECIALIST

## 2022-07-07 PROCEDURE — 4010F ACE/ARB THERAPY RXD/TAKEN: CPT | Mod: CPTII,,, | Performed by: SPECIALIST

## 2022-07-07 PROCEDURE — 3008F BODY MASS INDEX DOCD: CPT | Mod: CPTII,,, | Performed by: SPECIALIST

## 2022-07-07 PROCEDURE — 3075F PR MOST RECENT SYSTOLIC BLOOD PRESS GE 130-139MM HG: ICD-10-PCS | Mod: CPTII,,, | Performed by: SPECIALIST

## 2022-07-07 PROCEDURE — 3079F PR MOST RECENT DIASTOLIC BLOOD PRESSURE 80-89 MM HG: ICD-10-PCS | Mod: CPTII,,, | Performed by: SPECIALIST

## 2022-07-13 ENCOUNTER — HOSPITAL ENCOUNTER (OUTPATIENT)
Dept: RADIOLOGY | Facility: HOSPITAL | Age: 52
Discharge: HOME | End: 2022-07-13
Attending: NURSE PRACTITIONER
Payer: MEDICAID

## 2022-07-13 ENCOUNTER — OFFICE VISIT (OUTPATIENT)
Dept: ORTHOPEDICS | Facility: CLINIC | Age: 52
End: 2022-07-13
Payer: MEDICAID

## 2022-07-13 DIAGNOSIS — R52 PAIN: ICD-10-CM

## 2022-07-13 DIAGNOSIS — S82.852D CLOSED DISPLACED TRIMALLEOLAR FRACTURE OF LEFT ANKLE WITH ROUTINE HEALING, SUBSEQUENT ENCOUNTER: Primary | ICD-10-CM

## 2022-07-13 DIAGNOSIS — M67.431 GANGLION CYST OF DORSUM OF RIGHT WRIST: ICD-10-CM

## 2022-07-13 DIAGNOSIS — R52 PAIN: Primary | ICD-10-CM

## 2022-07-13 DIAGNOSIS — M77.32 HEEL SPUR, LEFT: ICD-10-CM

## 2022-07-13 PROCEDURE — 99214 OFFICE O/P EST MOD 30 MIN: CPT | Mod: S$PBB,,, | Performed by: NURSE PRACTITIONER

## 2022-07-13 PROCEDURE — 1159F MED LIST DOCD IN RCRD: CPT | Mod: CPTII,,, | Performed by: NURSE PRACTITIONER

## 2022-07-13 PROCEDURE — 73610 X-RAY EXAM OF ANKLE: CPT | Mod: TC,LT

## 2022-07-13 PROCEDURE — 99999 PR PBB SHADOW E&M-EST. PATIENT-LVL II: ICD-10-PCS | Mod: PBBFAC,,, | Performed by: NURSE PRACTITIONER

## 2022-07-13 PROCEDURE — 99214 PR OFFICE/OUTPT VISIT, EST, LEVL IV, 30-39 MIN: ICD-10-PCS | Mod: S$PBB,,, | Performed by: NURSE PRACTITIONER

## 2022-07-13 PROCEDURE — 99999 PR PBB SHADOW E&M-EST. PATIENT-LVL II: CPT | Mod: PBBFAC,,, | Performed by: NURSE PRACTITIONER

## 2022-07-13 PROCEDURE — 73650 XR CALCANEUS 2 VIEW LEFT: ICD-10-PCS | Mod: 26,LT,, | Performed by: INTERNAL MEDICINE

## 2022-07-13 PROCEDURE — 73610 XR ANKLE COMPLETE 3 VIEW LEFT: ICD-10-PCS | Mod: 26,LT,, | Performed by: INTERNAL MEDICINE

## 2022-07-13 PROCEDURE — 4010F ACE/ARB THERAPY RXD/TAKEN: CPT | Mod: CPTII,,, | Performed by: NURSE PRACTITIONER

## 2022-07-13 PROCEDURE — 1160F PR REVIEW ALL MEDS BY PRESCRIBER/CLIN PHARMACIST DOCUMENTED: ICD-10-PCS | Mod: CPTII,,, | Performed by: NURSE PRACTITIONER

## 2022-07-13 PROCEDURE — 1160F RVW MEDS BY RX/DR IN RCRD: CPT | Mod: CPTII,,, | Performed by: NURSE PRACTITIONER

## 2022-07-13 PROCEDURE — 1159F PR MEDICATION LIST DOCUMENTED IN MEDICAL RECORD: ICD-10-PCS | Mod: CPTII,,, | Performed by: NURSE PRACTITIONER

## 2022-07-13 PROCEDURE — 4010F PR ACE/ARB THEARPY RXD/TAKEN: ICD-10-PCS | Mod: CPTII,,, | Performed by: NURSE PRACTITIONER

## 2022-07-13 PROCEDURE — 99212 OFFICE O/P EST SF 10 MIN: CPT | Mod: PBBFAC | Performed by: NURSE PRACTITIONER

## 2022-07-13 PROCEDURE — 73650 X-RAY EXAM OF HEEL: CPT | Mod: 26,LT,, | Performed by: INTERNAL MEDICINE

## 2022-07-13 PROCEDURE — 73650 X-RAY EXAM OF HEEL: CPT | Mod: TC,LT

## 2022-07-13 PROCEDURE — 73610 X-RAY EXAM OF ANKLE: CPT | Mod: 26,LT,, | Performed by: INTERNAL MEDICINE

## 2022-07-13 NOTE — PROGRESS NOTES
Principal Orthopedic Problem:   Left trimalleolar ankle fracture dislocation, closed, displaced, subsequent encounter  Left ankle syndesmosis injury  Fall from standing  Cyst to right wrist  Left heel pain     Relevant Medical History: according to chart    PMHX: HTN    Lives at home with his wife  Works construction  Independent community ambulator, no gait aids  Nonsmoker  Denies diabetes  Denies history of heart attack, stroke, blood clot, cancer    HPI: Mr. Lancaster is 51-year-old male, , mechanical fall from standing, injury to left ankle 05/27/2021, immediate deformity, pain, inability bear weight.  Came to emergency department.  Seen and evaluated by the emergency medicine team, orthopedic resident on-call team.  Physical exam and x-rays indicated a left trimalleolar ankle fracture dislocation.  Closed reduction and splinting was performed in the ER.  05/28/2021 - ORIF left ankle, syndesmosis    Mr. Lancaster is here today for a follow up visit    Interval History:  he reports that he is doing well.  He was last seen in the clinic in Nov 2021.  He denies falls or injuries since then.  He reports intermittent pain to his left heel when he wears shoes.  He has also noticed a lump to his right wrist (valle side) that is painful.  He is requesting a work note for 1 year as he is trying to get a reduction in his child support.  He is also requesting a temporary handicap for his back and ankle issues.  He is no longer wearing any supportive brace to his left ankle.  Denies numbness or tingling sensation to his foot or toes.  States his pain to his heel goes away when he wears his slippers or rest.  He would like to get more therapy as he felt this helped him in the past.  He is in regular shoes.  he denies fever, chills, and sweats since the time of the surgery.     Complications since time of surgery: none    Physical exam:  His incision is open air and healed.  There is no redness or drainage  present.  He has approximately 25 degree range of motion in all planes.  Appears to be neurovascularly intact.  Strength in ankle is 5/5.  There is no swelling to his ankle.  PPP x 2 on left.  Right wrist there is what appears to be a ganglion cyst on valle surface of his hand.      RADS: All pertinent images were reviewed by myself:   Ankle complete three views left: There is hardware stabilizing distal tib fib fractures.  There is good alignment no complication.  There is DJD and spurs on the calcaneus.  Fracture has healed.  He also has calcaneus spurs present.    Assessment:  Follow up visit (14 months)    1. Closed displaced trimalleolar fracture of left ankle with routine healing, subsequent encounter    - Ambulatory referral/consult to Physical/Occupational Therapy; Future    2. Heel spur, left      3. Ganglion cyst of dorsum of right wrist    51-year-old male, fall 05/27/2021, left trimalleolar ankle fracture dislocation     05/28/2021 - ORIF left ankle, syndesmosis    Plan:  Current care, treatment plan, precautions, activity level/ modifications, limitations, rehabilitation exercises and proposed future treatment were discussed with the patient. We discussed the need to monitor for changes in symptoms and condition and report them to the physician.  Discussed importance of compliance with all appointments and follow up examinations.     OTHER:  - Will refer to hand clinic for his ganglion cyst.  - Work note given from date of surgery to 6 weeks post surgery.  He was told any additional time he may need, he needs to get from his PCP.  - Recommend heel pads for his spurs.      PHYSICAL THERAPY:   - PT/OT - referral back to [facility] per request.  Weight bear as tolerated.   - ROM as tolerated.     PAIN MEDICATION:   - Pain medication: refill needed, none.  - Pain medication refill policy provided to patient for review, yes.    - Patient was informed a multi-modal approach is used to treat their pain.  With the goal to get off of narcotic pain medication and discontinue as soon as possible.      FOLLOW UP:   - Patient will follow up in the clinic PRN.  - X-ray of his ankle is needed.      If there are any questions prior to scheduled follow up, the patient was instructed to contact the office

## 2022-07-21 ENCOUNTER — CLINICAL SUPPORT (OUTPATIENT)
Dept: REHABILITATION | Facility: HOSPITAL | Age: 52
End: 2022-07-21
Payer: MEDICAID

## 2022-07-21 DIAGNOSIS — M25.672 DECREASED RANGE OF MOTION OF LEFT ANKLE: Primary | ICD-10-CM

## 2022-07-21 DIAGNOSIS — S82.852D CLOSED DISPLACED TRIMALLEOLAR FRACTURE OF LEFT ANKLE WITH ROUTINE HEALING, SUBSEQUENT ENCOUNTER: ICD-10-CM

## 2022-07-21 PROCEDURE — 97110 THERAPEUTIC EXERCISES: CPT

## 2022-07-21 PROCEDURE — 97161 PT EVAL LOW COMPLEX 20 MIN: CPT

## 2022-07-21 NOTE — PLAN OF CARE
[facility]   Physical Therapy Initial Evaluation     Date: 7/21/2022   [id number]    Therapy Diagnosis:   Encounter Diagnoses   Name Primary?    Closed displaced trimalleolar fracture of left ankle with routine healing, subsequent encounter     Decreased range of motion of left ankle Yes     Physician: Dayday Correia NP    Physician Orders: PT Eval and Treat   Medical Diagnosis from Referral: S82.852D (ICD-10-CM) - Closed displaced trimalleolar fracture of left ankle with routine healing, subsequent encounter  Evaluation Date: 7/21/2022  Authorization Period Expiration: TBD  Plan of Care Expiration: 10/21/2022  Progress Note Due: 8/21/2022  Visit # / Visits authorized: 1/ 1   FOTO: 0/5    Precautions: Standard     Time In: 4:02  Time Out: 4:45  Total Appointment Time (timed & untimed codes): 43 minutes      SUBJECTIVE     Date of onset: 5/27/2022 - trimalleolar fx    History of current condition - Naresh reports: Pt had trimalleolar left ankle fx last year that required surgery. Pt went to PT and had decreased pain but feels he needs more exercises to further make pain go away. Pt states that he has been having pain in his left ankle. It is worse after walking for a long period and then resting. Pain is a throbbing pain. Ankle is stiff in the morning. Pt had heel pain after walking a lot at in [place]. Pt was found to have bone spur on left heel with latest x-ray.     Falls: No    Imaging,  bone scan films:     Prior Therapy: Yes  Social History:  lives with their spouse  Occupation: No - due to injury  Prior Level of Function: Inpendent   Current Level of Function: Limited    Pain:  Current 0/10, worst 10/10, best 0/10   Location: left ankles   Description: Aching and Throbbing  Aggravating Factors: Walking  Easing Factors: massage    Patients goals: Pt would like to increase strength in his left ankle     Medical History:   Past Medical  History:   Diagnosis Date    Hypertension     Respiratory distress     patient reports low oxygen after ankle surgery    Sleep apnea     unable to tolerate cpap       Surgical History:   Naresh Lancaster  has a past surgical history that includes Ankle fracture surgery (Left, 2021) and Uvulopalatopharyngoplasty (Bilateral, 6/20/2022).    Medications:   Naresh has a current medication list which includes the following prescription(s): fluticasone propionate, lisinopril, and loratadine.    Allergies:   Review of patient's allergies indicates:  No Known Allergies       OBJECTIVE       Hip Right Right Right Left Left Left Pain/Dysfunction with Movement    AROM PROM MMT AROM PROM MMT    Flexion WNL WNL 5/5 WNL WNL 4/5    Extension WNL WNL 5/5 WNL WNL 3+/5    Abduction WNL WNL 5/5 WNL WNL 4+/5       Knee Right Right Right Left Left Left Pain/Dysfunction with Movement    AROM PROM MMT AROM PROM MMT    Flexion WNL WNL 5/5 WNL WNL 4/5    Extension WNL WNL 5/5 WNL WNL 4/5      Ankle Right Right Right Left Left Left Pain/Dysfunction with Movement    AROM PROM MMT AROM PROM MMT    Plantarflexion 60 60 5/5 60 60 4/5    Dorsiflexion 10 10 5/5 5 5 4/5    Inversion 35 35 5/5 35 35 4/5    Eversion 25 20 5/5 20 20 4/5          Limitation/Restriction for FOTO Ankle Survey    Therapist reviewed FOTO scores for Naresh Lancaster on 7/21/2022.   FOTO documents entered into be[employer] - see Media section.    Limitation Score: 59%         TREATMENT     Total Treatment time (time-based codes) separate from Evaluation: 15 minutes      Naresh received the treatments listed below:      therapeutic exercises to develop strength, endurance, ROM and flexibility for 15 minutes including:  Calf Raises 3x10 2 cord  Ankle dorsiflexion stretch 3x30s      PATIENT EDUCATION AND HOME EXERCISES     Education provided:   - Continue with HEP from last PT, Role of PT    Written Home Exercises Provided: Patient instructed to cont prior HEP. Exercises were reviewed  and Naresh was able to demonstrate them prior to the end of the session.  Naresh demonstrated good  understanding of the education provided. See EMR under Patient Instructions for exercises provided during therapy sessions.    ASSESSMENT     Naresh is a 51 y.o. male referred to outpatient Physical Therapy with a medical diagnosis of  S82.852D (ICD-10-CM) - Closed displaced trimalleolar fracture of left ankle with routine healing, subsequent encounter    Patient presents with decrease ankle range of motion, gait dysfunction, and left lower extremity weakness.  Pt needs to increase intensity of exercises in order to further progress in her recovery from ankle injury last year. Pt has previously responded well to physical therapy and needs to be educated on how to maintain the gains he achieves.     Patient prognosis is Good.   Patient will benefit from skilled outpatient Physical Therapy to address the deficits stated above and in the chart below, provide patient /family education, and to maximize patientt's level of independence.     Plan of care discussed with patient: Yes  Patient's spiritual, cultural and educational needs considered and patient is agreeable to the plan of care and goals as stated below:     Anticipated Barriers for therapy: None    Medical Necessity is demonstrated by the following  History  Co-morbidities and personal factors that may impact the plan of care Co-morbidities:   See Medical History    Personal Factors:   no deficits     low   Examination  Body Structures and Functions, activity limitations and participation restrictions that may impact the plan of care Body Regions:   lower extremities    Body Systems:    ROM  strength    Participation Restrictions:   Walking with Kida    Activity limitations:   Learning and applying knowledge  no deficits    General Tasks and Commands  no deficits    Communication  no deficits    Mobility  walking    Self care  no deficits    Domestic Life  no  deficits    Interactions/Relationships  no deficits    Life Areas  no deficits    Community and Social Life  no deficits         low   Clinical Presentation stable and uncomplicated low   Decision Making/ Complexity Score: low     Goals:  Short Term Goals (4 Weeks):   1. Pt will be compliant with HEP to supplement PT in restoring pain free function.  2. Pt will improve impaired LE MMTs by 1/2 grade  to improve strength for functional tasks  3. Pt improve impaired Ankle ROM by 5 deg to improve mobility for normal movement patterns.   Long Term Goals (8 Weeks):  1. Pt will improve FOTO score to </= 39% limited to decrease perceived limitation with mobility  2. Pt will improve impaired LE MMTs by 1 grade to improve strength for functional tasks.  3. Pt improve impaired Ankle ROM to WNL in all planes to improve mobility for normal movement patterns.         PLAN   Plan of care Certification: 7/21/2022 to 10/21/2022.    Outpatient Physical Therapy 2 times weekly for 10 weeks to include the following interventions: Electrical Stimulation IFC, Tens, Dry needling, Gait Training, Manual Therapy, Moist Heat/ Ice, Neuromuscular Re-ed, Patient Education, Therapeutic Activities and Therapeutic Exercise.     Jaime Mcallister, PT      I CERTIFY THE NEED FOR THESE SERVICES FURNISHED UNDER THIS PLAN OF TREATMENT AND WHILE UNDER MY CARE   Physician's comments:     Physician's Signature: ___________________________________________________

## 2022-08-01 ENCOUNTER — TELEPHONE (OUTPATIENT)
Dept: REHABILITATION | Facility: HOSPITAL | Age: 52
End: 2022-08-01
Payer: MEDICAID

## 2022-08-01 NOTE — TELEPHONE ENCOUNTER
Left message on cell phone concerning patients missed visit today and next scheduled ramón on 8/4.

## 2022-09-09 ENCOUNTER — TELEPHONE (OUTPATIENT)
Dept: ORTHOPEDICS | Facility: CLINIC | Age: 52
End: 2022-09-09
Payer: MEDICAID

## 2022-09-09 DIAGNOSIS — M67.432 GANGLION CYST OF WRIST, LEFT: Primary | ICD-10-CM

## 2022-09-12 ENCOUNTER — OFFICE VISIT (OUTPATIENT)
Dept: ORTHOPEDICS | Facility: CLINIC | Age: 52
End: 2022-09-12
Payer: MEDICAID

## 2022-09-12 ENCOUNTER — HOSPITAL ENCOUNTER (OUTPATIENT)
Dept: RADIOLOGY | Facility: HOSPITAL | Age: 52
Discharge: HOME | End: 2022-09-12
Attending: ORTHOPAEDIC SURGERY
Payer: MEDICAID

## 2022-09-12 VITALS — HEIGHT: 71 IN | BODY MASS INDEX: 34.3 KG/M2 | WEIGHT: 245 LBS

## 2022-09-12 DIAGNOSIS — M67.40 GANGLION CYST: ICD-10-CM

## 2022-09-12 DIAGNOSIS — M67.431 GANGLION CYST OF WRIST, RIGHT: ICD-10-CM

## 2022-09-12 DIAGNOSIS — M67.432 GANGLION CYST OF WRIST, LEFT: ICD-10-CM

## 2022-09-12 PROCEDURE — 1159F PR MEDICATION LIST DOCUMENTED IN MEDICAL RECORD: ICD-10-PCS | Mod: CPTII,,, | Performed by: ORTHOPAEDIC SURGERY

## 2022-09-12 PROCEDURE — 3008F PR BODY MASS INDEX (BMI) DOCUMENTED: ICD-10-PCS | Mod: CPTII,,, | Performed by: ORTHOPAEDIC SURGERY

## 2022-09-12 PROCEDURE — 73110 X-RAY EXAM OF WRIST: CPT | Mod: 26,RT,, | Performed by: RADIOLOGY

## 2022-09-12 PROCEDURE — 99999 PR PBB SHADOW E&M-EST. PATIENT-LVL II: CPT | Mod: PBBFAC,,, | Performed by: ORTHOPAEDIC SURGERY

## 2022-09-12 PROCEDURE — 73110 X-RAY EXAM OF WRIST: CPT | Mod: TC,FY,RT

## 2022-09-12 PROCEDURE — 73110 XR WRIST COMPLETE 3 VIEWS RIGHT: ICD-10-PCS | Mod: 26,RT,, | Performed by: RADIOLOGY

## 2022-09-12 PROCEDURE — 4010F PR ACE/ARB THEARPY RXD/TAKEN: ICD-10-PCS | Mod: CPTII,,, | Performed by: ORTHOPAEDIC SURGERY

## 2022-09-12 PROCEDURE — 99203 OFFICE O/P NEW LOW 30 MIN: CPT | Mod: S$PBB,,, | Performed by: ORTHOPAEDIC SURGERY

## 2022-09-12 PROCEDURE — 3008F BODY MASS INDEX DOCD: CPT | Mod: CPTII,,, | Performed by: ORTHOPAEDIC SURGERY

## 2022-09-12 PROCEDURE — 1159F MED LIST DOCD IN RCRD: CPT | Mod: CPTII,,, | Performed by: ORTHOPAEDIC SURGERY

## 2022-09-12 PROCEDURE — 99203 PR OFFICE/OUTPT VISIT, NEW, LEVL III, 30-44 MIN: ICD-10-PCS | Mod: S$PBB,,, | Performed by: ORTHOPAEDIC SURGERY

## 2022-09-12 PROCEDURE — 99212 OFFICE O/P EST SF 10 MIN: CPT | Mod: PBBFAC,PN | Performed by: ORTHOPAEDIC SURGERY

## 2022-09-12 PROCEDURE — 99999 PR PBB SHADOW E&M-EST. PATIENT-LVL II: ICD-10-PCS | Mod: PBBFAC,,, | Performed by: ORTHOPAEDIC SURGERY

## 2022-09-12 PROCEDURE — 4010F ACE/ARB THERAPY RXD/TAKEN: CPT | Mod: CPTII,,, | Performed by: ORTHOPAEDIC SURGERY

## 2022-09-12 RX ORDER — LISINOPRIL 40 MG/1
40 TABLET ORAL DAILY
COMMUNITY
Start: 2022-08-08

## 2022-09-12 RX ORDER — DICLOFENAC SODIUM 10 MG/G
2 GEL TOPICAL 3 TIMES DAILY
Qty: 100 G | Refills: 1 | Status: SHIPPED | OUTPATIENT
Start: 2022-09-12

## 2022-09-12 NOTE — PROGRESS NOTES
"Subjective:      Patient ID: Naresh Lancaster is a 51 y.o. male.    Chief Complaint: Cyst of the Left Wrist      HPI  Naresh Lancaster is a  51 y.o. male presenting today for or painful mass on his right wrist.  There was not a history of trauma.  Onset of symptoms began several months ago it has actually gotten quite a bit smaller in the past month or 2 originally was about the size of a nickel and has gone down to a small pea   Still causes pain when he presses on it no numbness or tingling reported.      Review of patient's allergies indicates:  No Known Allergies      Current Outpatient Medications   Medication Sig Dispense Refill    fluticasone propionate (FLONASE) 50 mcg/actuation nasal spray 2 sprays (100 mcg total) by Each Nostril route once daily. 18.2 mL 11    lisinopriL (PRINIVIL,ZESTRIL) 40 MG tablet Take 40 mg by mouth once daily.      loratadine (CLARITIN) 10 mg tablet Take 1 tablet (10 mg total) by mouth once daily. 30 tablet 11    diclofenac sodium (VOLTAREN) 1 % Gel Apply 2 g topically 3 (three) times daily. 100 g 1     No current facility-administered medications for this visit.       Past Medical History:   Diagnosis Date    Hypertension     Respiratory distress     patient reports low oxygen after ankle surgery    Sleep apnea     unable to tolerate cpap       Past Surgical History:   Procedure Laterality Date    ANKLE FRACTURE SURGERY Left 2021    UVULOPALATOPHARYNGOPLASTY Bilateral 6/20/2022    Procedure: UPPP (UVULOPALATOPHARYNGOPLASTY);  Surgeon: KAILEE Sun MD;  Location: [facility];  Service: ENT;  Laterality: Bilateral;       Review of Systems:  ROS    OBJECTIVE:     PHYSICAL EXAM:  Height: 5' 11" (180.3 cm) Weight: 111.1 kg (245 lb)  Vitals:    09/12/22 1320   Weight: 111.1 kg (245 lb)   Height: 5' 11" (1.803 m)   PainSc:   8   PainLoc: Wrist     Well developed, well nourished male in no acute distress  Alert and oriented x 3  HEENT- Normal exam  Lungs- Clear to auscultation  Heart- Regular " rate and rhythm  Abdomen- Soft nontender  Extremity exam- examination right wrist there is some mild tenderness on the volar radial side of the wrist maybe a small palpable mass located just distal to the scaphoid tubercle   Mildly tender   Range of motion wrist fingers full   strength intact Tinel sign negative    RADIOGRAPHS:  AP lateral x-rays right hand wrist demonstrate no significant abnormalities  Comments: I have personally reviewed the imaging and I agree with the above radiologist's report.    ASSESSMENT/PLAN:     IMPRESSION:  Volar ganglion cyst right wrist improving    PLAN:  Explained the nature of the problem to the patient   Fortunately the cyst is getting smaller so I recommended we just keep an eye on it I have ordered some Voltaren gel for topical use  Also I have given him a wrist brace which should give some compression and help with symptoms   Follow-up 2-3 months or as needed       - We talked at length about the anatomy and pathophysiology of   Encounter Diagnosis   Name Primary?    Ganglion cyst            Disclaimer: This note has been generated using voice-recognition software. There may be typographical errors that have been missed during proof-reading.

## 2023-06-07 ENCOUNTER — TELEPHONE (OUTPATIENT)
Dept: ORTHOPEDICS | Facility: CLINIC | Age: 53
End: 2023-06-07
Payer: MEDICAID

## 2023-06-07 NOTE — TELEPHONE ENCOUNTER
Called and explain to pt I am unable to schedule an appt with Dayday due to pt having a new issue with medicaid. Informed pt we can only scheduled him if he is being seeing for the  issue he originally was seen for. Gave pt the number to [facility] to scheduled an appt. Pt verbally understood and says he will give [facility] a call.

## 2023-10-21 ENCOUNTER — HOSPITAL ENCOUNTER (EMERGENCY)
Facility: HOSPITAL | Age: 53
Discharge: HOME | End: 2023-10-21
Attending: EMERGENCY MEDICINE
Payer: MEDICAID

## 2023-10-21 VITALS
HEART RATE: 91 BPM | OXYGEN SATURATION: 99 % | WEIGHT: 250 LBS | DIASTOLIC BLOOD PRESSURE: 92 MMHG | BODY MASS INDEX: 34.87 KG/M2 | TEMPERATURE: 99 F | SYSTOLIC BLOOD PRESSURE: 160 MMHG | RESPIRATION RATE: 18 BRPM

## 2023-10-21 DIAGNOSIS — M25.511 SHOULDER PAIN, RIGHT: ICD-10-CM

## 2023-10-21 PROCEDURE — 25000003 PHARM REV CODE 250: Performed by: EMERGENCY MEDICINE

## 2023-10-21 PROCEDURE — 99284 EMERGENCY DEPT VISIT MOD MDM: CPT

## 2023-10-21 PROCEDURE — 96372 THER/PROPH/DIAG INJ SC/IM: CPT | Performed by: EMERGENCY MEDICINE

## 2023-10-21 PROCEDURE — 63600175 PHARM REV CODE 636 W HCPCS: Performed by: EMERGENCY MEDICINE

## 2023-10-21 RX ORDER — ACETAMINOPHEN 500 MG
1000 TABLET ORAL
Status: COMPLETED | OUTPATIENT
Start: 2023-10-21 | End: 2023-10-21

## 2023-10-21 RX ORDER — MELOXICAM 7.5 MG/1
7.5 TABLET ORAL DAILY
Qty: 5 TABLET | Refills: 0 | Status: SHIPPED | OUTPATIENT
Start: 2023-10-21

## 2023-10-21 RX ORDER — KETOROLAC TROMETHAMINE 30 MG/ML
30 INJECTION, SOLUTION INTRAMUSCULAR; INTRAVENOUS
Status: COMPLETED | OUTPATIENT
Start: 2023-10-21 | End: 2023-10-21

## 2023-10-21 RX ADMIN — KETOROLAC TROMETHAMINE 30 MG: 30 INJECTION, SOLUTION INTRAMUSCULAR; INTRAVENOUS at 10:10

## 2023-10-21 RX ADMIN — ACETAMINOPHEN 1000 MG: 500 TABLET ORAL at 10:10

## 2023-10-21 NOTE — DISCHARGE INSTRUCTIONS

## 2023-10-21 NOTE — ED PROVIDER NOTES
Encounter Date: 10/21/2023       History     Chief Complaint   Patient presents with    Shoulder Pain     Pt states that he is in the er due to right shoulder pain that has been ongoing for approximately 2 months due to sleeping on it the wrong way. Pt states this morning when laying on the sofa he heard a pop and felt excruciating pain. Pt states that the pain is different from the chronic pain. Pt is able to move the extremity.      Naresh Lancaster is a 52 y.o. male who  has a past medical history of Hypertension, Respiratory distress, and Sleep apnea.    The patient presents to the ED due to right shoulder pain.  Patient states that he was sleeping on his back and rolled onto his right shoulder and felt a pop and has had decreased range of movement since then.  This occurred this morning.  He has a history of chronic shoulder pain issues however this was exacerbated today.  He denies any therapies prior to arrival.  He states it hurts with movement.  He denies any numbness weakness chest pain sweating vomiting or other concerns.        The history is provided by the patient.     Review of patient's allergies indicates:  No Known Allergies  Past Medical History:   Diagnosis Date    Hypertension     Respiratory distress     patient reports low oxygen after ankle surgery    Sleep apnea     unable to tolerate cpap     Past Surgical History:   Procedure Laterality Date    ANKLE FRACTURE SURGERY Left 2021    UVULOPALATOPHARYNGOPLASTY Bilateral 6/20/2022    Procedure: UPPP (UVULOPALATOPHARYNGOPLASTY);  Surgeon: KAILEE Sun MD;  Location: [facility];  Service: ENT;  Laterality: Bilateral;     Family History   Problem Relation Age of Onset    Pacemaker/defibrilator Mother      Social History     Tobacco Use    Smoking status: Former     Current packs/day: 1.50     Average packs/day: 1.5 packs/day for 6.0 years (9.0 ttl pk-yrs)     Types: Cigarettes    Smokeless tobacco: Never   Substance Use Topics    Alcohol use: Yes      Comment: occasional     Review of Systems   Constitutional:  Negative for fever.   HENT:  Negative for sore throat.    Respiratory:  Negative for shortness of breath.    Cardiovascular:  Negative for chest pain.   Gastrointestinal:  Negative for nausea.   Genitourinary:  Negative for dysuria.   Musculoskeletal:  Positive for arthralgias. Negative for back pain.   Skin:  Negative for rash.   Neurological:  Negative for weakness.   Hematological:  Does not bruise/bleed easily.       Physical Exam     Initial Vitals [10/21/23 0939]   BP Pulse Resp Temp SpO2   (!) 160/92 91 18 98.7 °F (37.1 °C) 99 %      MAP       --         Physical Exam    Nursing note and vitals reviewed.  Constitutional: He appears well-developed and well-nourished. He is not diaphoretic. No distress.   HENT:   Head: Normocephalic and atraumatic.   Mouth/Throat: Oropharynx is clear and moist.   Eyes: EOM are normal. Pupils are equal, round, and reactive to light.   Neck: No tracheal deviation present.   Cardiovascular:  Normal rate, regular rhythm, normal heart sounds and intact distal pulses.           Pulmonary/Chest: Breath sounds normal. No stridor. No respiratory distress.   Abdominal: Abdomen is soft. He exhibits no distension and no mass. There is no abdominal tenderness.   Musculoskeletal:         General: No edema. Normal range of motion.      Comments: Right upper extremity:  Diffuse tenderness to palpation of the anterior shoulder.  No skin changes or significant swelling.  Range of motion limited secondary to pain.    Radial pulse palpable.  No wrist elbow or forearm tenderness,.  Sensation intact to light touch     Neurological: He is alert and oriented to person, place, and time. No cranial nerve deficit or sensory deficit.   Skin: Skin is warm and dry. Capillary refill takes less than 2 seconds. No rash noted.   Psychiatric: He has a normal mood and affect.         ED Course   Procedures  Labs Reviewed - No data to display        Imaging Results    None          Medications   ketorolac injection 30 mg (has no administration in time range)   acetaminophen tablet 1,000 mg (has no administration in time range)     Medical Decision Making  Differential Diagnosis includes, but is not limited to:  Fracture, dislocation, compartment syndrome, nerve injury/palsy, vascular injury, rhabdomyolysis, hemarthrosis, septic joint, bursitis, muscle strain, ligament tear/sprain, laceration with foreign body, abrasion, soft tissue contusion, osteoarthritis.      Amount and/or Complexity of Data Reviewed  Radiology: ordered. Decision-making details documented in ED Course.    Risk  OTC drugs.  Prescription drug management.  Risk Details: Pain is improved on reassessment.  I do not suspect a septic joint fracture dislocation or emergent process to explain his pain today.  Patient for discharge at this time recommend he follow-up closely with his primary care doctor for further evaluation of his symptoms/orthopedics.  Discussed return precautions for worsening symptoms or any other concerns.      After taking into careful account the historical factors and physical exam findings of the patient's presentation today, in conjunction with the empirical and objective data obtained on ED workup, no acute emergent medical condition has been identified. The patient appears to be low risk for an emergent medical condition and I feel it is safe and appropriate at this time for the patient to be discharged to follow-up as detailed in their discharge instructions for reevaluation and possible continued outpatient workup and management. I have discussed the specifics of the workup with the patient and the patient has verbalized understanding of the details of the workup, the diagnosis, the treatment plan, and the need for outpatient follow-up.  Although the patient has no emergent etiology today this does not preclude the development of an emergent condition so in  addition, I have advised the patient that they can return to the ED and/or activate EMS at any time with worsening of their symptoms, change of their symptoms, or with any other medical complaint.  The patient remained comfortable and stable during their visit in the ED.  Discharge and follow-up instructions discussed with the patient who expressed understanding and willingness to comply with my recommendations.                 ED Course as of 10/21/23 1054   Sat Oct 21, 2023   1041 X-Ray Shoulder Trauma Right  X-Ray Reviewed. Negative for acute fracture or dislocation by my independent interpretation, awaiting formal radiology read.   [RN]   1051 X-Ray Shoulder Trauma Right [RN]      ED Course User Index  [RN] Ray Vega Jr., MD                    Clinical Impression:   Final diagnoses:  [M25.511] Shoulder pain, right       Portions of this note were dictated using voice recognition software and may contain dictation related errors in spelling/grammar/syntax not found on text review          Ray Vega Jr., MD  10/21/23 4317

## 2023-11-28 NOTE — ANESTHESIA PREPROCEDURE EVALUATION
06/15/2022  Naresh Lancaster is a 51 y.o., male.      Pre-op Assessment    I have reviewed the Patient Summary Reports.     I have reviewed the Nursing Notes. I have reviewed the NPO Status.   I have reviewed the Medications.     Review of Systems  Anesthesia Hx:  No problems with previous Anesthesia  Denies Family Hx of Anesthesia complications.   Denies Personal Hx of Anesthesia complications.   Social:  Non-Smoker    Hematology/Oncology:  Hematology Normal   Oncology Normal     EENT/Dental:EENT/Dental Normal   Cardiovascular:   Exercise tolerance: good Hypertension    Pulmonary:   Sleep Apnea    Renal/:  Renal/ Normal     Musculoskeletal:  Musculoskeletal Normal    Neurological:  Neurology Normal    Endocrine:  Endocrine Normal  Obesity / BMI > 30  Dermatological:  Skin Normal    Psych:  Psychiatric Normal           Physical Exam  General: Well nourished, Cooperative, Oriented and Alert    Airway:  Mouth Opening: Normal  TM Distance: Normal  Neck ROM: Normal ROM    Dental:  Intact        Anesthesia Plan  Type of Anesthesia, risks & benefits discussed:    Anesthesia Type: Gen ETT  Intra-op Monitoring Plan: Standard ASA Monitors  Post Op Pain Control Plan: multimodal analgesia  Induction:  IV  Airway Plan: Video and Direct  Informed Consent: Informed consent signed with the Patient and all parties understand the risks and agree with anesthesia plan.  All questions answered.   ASA Score: 2  Day of Surgery Review of History & Physical: H&P Update referred to the surgeon/provider.  Anesthesia Plan Notes: Labs elsewhere    Ready For Surgery From Anesthesia Perspective.     .      
Hide Additional Notes?: No
Detail Level: Zone

## (undated) DEVICE — BLADE ELECTRO EDGE INSULATED

## (undated) DEVICE — APPLICATOR CHLORAPREP ORN 26ML

## (undated) DEVICE — BIT DRILL OVERDRILL AO 2.7MM

## (undated) DEVICE — SUT 4-0 CHROMIC GUT / P-3

## (undated) DEVICE — SUT VICRYL 3-0 27 SH

## (undated) DEVICE — Device

## (undated) DEVICE — CORD BIPOLAR 12 FOOT

## (undated) DEVICE — GUIDEWIRE UT 1.4X150MM
Type: IMPLANTABLE DEVICE | Site: ANKLE | Status: NON-FUNCTIONAL
Removed: 2021-05-28

## (undated) DEVICE — SEE MEDLINE ITEM 152622

## (undated) DEVICE — BANDAGE ESMARK 6X12

## (undated) DEVICE — TUBE NG SUMP PVC 16FR 48IN

## (undated) DEVICE — CATH RED RUBBER 8FR

## (undated) DEVICE — DRAPE STERI U-SHAPED 47X51IN

## (undated) DEVICE — BANDAGE ACE ELASTIC 6"

## (undated) DEVICE — ELECTRODE REM PLYHSV RETURN 9

## (undated) DEVICE — SEE MEDLINE ITEM 157150

## (undated) DEVICE — SPONGE TONSIL DBL STRG MED STR

## (undated) DEVICE — DRESSING GAUZE XEROFORM 5X9

## (undated) DEVICE — SEE MEDLINE ITEM 146345

## (undated) DEVICE — BLADE #15 STERILE CARBON

## (undated) DEVICE — DRAPE C ARM 42 X 120 10/BX

## (undated) DEVICE — CATH SUCTION 10FR

## (undated) DEVICE — SEE MEDLINE ITEM 146298

## (undated) DEVICE — SEE MEDLINE ITEM 146417

## (undated) DEVICE — SEE MEDLINE ITEM 152529

## (undated) DEVICE — BIT DRILL AO 2X135MM SCALED

## (undated) DEVICE — TRAY MINOR ORTHO

## (undated) DEVICE — SUT VICRYL PLUS 3-0 SH 18IN

## (undated) DEVICE — SHEET DRAPE FAN-FOLDED 3/4

## (undated) DEVICE — DRAPE C-ARMOR EQUIPMENT COVER

## (undated) DEVICE — SUT ETHILON 3/0 18IN PS-1

## (undated) DEVICE — DRAPE PLASTIC U 60X72

## (undated) DEVICE — ELECTRODE BLD EXT INSUL 1

## (undated) DEVICE — SUT VICRYL CTD 2-0 GI 27 SH

## (undated) DEVICE — SUT VICRYL PLUS 2-0 CT1 18

## (undated) DEVICE — SEE MEDLINE ITEM 157131

## (undated) DEVICE — GAUZE SPONGE 4X4 12PLY

## (undated) DEVICE — TOURNIQUET SB QC DP 34X4IN

## (undated) DEVICE — TAPE SURG DURAPORE 2 X10YD

## (undated) DEVICE — PAD CAST SPECIALIST STRL 6

## (undated) DEVICE — GUIDEWIRE ORTHO 1.6X150MM
Type: IMPLANTABLE DEVICE | Site: ANKLE | Status: NON-FUNCTIONAL
Removed: 2021-05-28

## (undated) DEVICE — STOCKINET 4INX48

## (undated) DEVICE — SEE MEDLINE ITEM 152515